# Patient Record
Sex: MALE | Race: WHITE | Employment: FULL TIME | URBAN - METROPOLITAN AREA
[De-identification: names, ages, dates, MRNs, and addresses within clinical notes are randomized per-mention and may not be internally consistent; named-entity substitution may affect disease eponyms.]

---

## 2015-08-05 LAB
ALBUMIN SERPL-MCNC: 4.7 G/DL
ALP BLD-CCNC: 72 U/L
ALT SERPL-CCNC: 28 U/L
ANION GAP SERPL CALCULATED.3IONS-SCNC: 2.2 MMOL/L
AST SERPL-CCNC: 16 U/L
BASOPHILS ABSOLUTE: NORMAL /ΜL
BASOPHILS RELATIVE PERCENT: NORMAL %
BILIRUB SERPL-MCNC: 0.7 MG/DL (ref 0.1–1.4)
BUN BLDV-MCNC: 11 MG/DL
CALCIUM SERPL-MCNC: 9.5 MG/DL
CHLORIDE BLD-SCNC: 100 MMOL/L
CHOLESTEROL, TOTAL: 203 MG/DL
CHOLESTEROL/HDL RATIO: 5.5
CO2: NORMAL MMOL/L
CREAT SERPL-MCNC: 0.88 MG/DL
EOSINOPHILS ABSOLUTE: NORMAL /ΜL
EOSINOPHILS RELATIVE PERCENT: NORMAL %
GFR CALCULATED: 118
GLUCOSE BLD-MCNC: 90 MG/DL
HCT VFR BLD CALC: 47.7 % (ref 41–53)
HDLC SERPL-MCNC: 37 MG/DL (ref 35–70)
HEMOGLOBIN: 16.1 G/DL (ref 13.5–17.5)
IRON: 97
LDL CHOLESTEROL CALCULATED: 125 MG/DL (ref 0–160)
LYMPHOCYTES ABSOLUTE: NORMAL /ΜL
LYMPHOCYTES RELATIVE PERCENT: NORMAL %
MCH RBC QN AUTO: 31 PG
MCHC RBC AUTO-ENTMCNC: 33.8 G/DL
MCV RBC AUTO: 92 FL
MONOCYTES ABSOLUTE: NORMAL /ΜL
MONOCYTES RELATIVE PERCENT: NORMAL %
NEUTROPHILS ABSOLUTE: NORMAL /ΜL
NEUTROPHILS RELATIVE PERCENT: NORMAL %
PLATELET # BLD: 239 K/ΜL
PMV BLD AUTO: NORMAL FL
POTASSIUM SERPL-SCNC: 4 MMOL/L
RBC # BLD: 5.2 10^6/ΜL
SODIUM BLD-SCNC: 142 MMOL/L
TOTAL PROTEIN: 6.8
TRIGL SERPL-MCNC: 204 MG/DL
URIC ACID: 7
VLDLC SERPL CALC-MCNC: 41 MG/DL
WBC # BLD: 5.6 10^3/ML

## 2016-06-24 LAB
ALBUMIN SERPL-MCNC: 4.7 G/DL
ALP BLD-CCNC: 72 U/L
ALT SERPL-CCNC: 32 U/L
ANION GAP SERPL CALCULATED.3IONS-SCNC: 2.2 MMOL/L
AST SERPL-CCNC: 18 U/L
AVERAGE GLUCOSE: NORMAL
BASOPHILS ABSOLUTE: NORMAL /ΜL
BASOPHILS RELATIVE PERCENT: NORMAL %
BILIRUB SERPL-MCNC: 0.7 MG/DL (ref 0.1–1.4)
BUN BLDV-MCNC: 10 MG/DL
CALCIUM SERPL-MCNC: 9.5 MG/DL
CHLORIDE BLD-SCNC: 101 MMOL/L
CHOLESTEROL, TOTAL: 206 MG/DL
CHOLESTEROL/HDL RATIO: 4.8
CO2: NORMAL MMOL/L
CREAT SERPL-MCNC: 0.87 MG/DL
EOSINOPHILS ABSOLUTE: NORMAL /ΜL
EOSINOPHILS RELATIVE PERCENT: NORMAL %
GFR CALCULATED: 118
GLUCOSE BLD-MCNC: 88 MG/DL
HBA1C MFR BLD: 5.5 %
HCT VFR BLD CALC: 42.9 % (ref 41–53)
HDLC SERPL-MCNC: 43 MG/DL (ref 35–70)
HEMOGLOBIN: 14.6 G/DL (ref 13.5–17.5)
IRON: 120
LDL CHOLESTEROL CALCULATED: 134 MG/DL (ref 0–160)
LYMPHOCYTES ABSOLUTE: NORMAL /ΜL
LYMPHOCYTES RELATIVE PERCENT: NORMAL %
MCH RBC QN AUTO: 30.7 PG
MCHC RBC AUTO-ENTMCNC: 34 G/DL
MCV RBC AUTO: 90 FL
MONOCYTES ABSOLUTE: NORMAL /ΜL
MONOCYTES RELATIVE PERCENT: NORMAL %
NEUTROPHILS ABSOLUTE: NORMAL /ΜL
NEUTROPHILS RELATIVE PERCENT: NORMAL %
PLATELET # BLD: 242 K/ΜL
PMV BLD AUTO: NORMAL FL
POTASSIUM SERPL-SCNC: 4.3 MMOL/L
RBC # BLD: 4.76 10^6/ΜL
SODIUM BLD-SCNC: 140 MMOL/L
TOTAL PROTEIN: 6.8
TRIGL SERPL-MCNC: 147 MG/DL
URIC ACID: 7.5
VLDLC SERPL CALC-MCNC: 29 MG/DL
WBC # BLD: 4.9 10^3/ML

## 2017-06-22 LAB
ALBUMIN SERPL-MCNC: 4.5 G/DL
ALP BLD-CCNC: 74 U/L
ALT SERPL-CCNC: 39 U/L
ANION GAP SERPL CALCULATED.3IONS-SCNC: 2 MMOL/L
AST SERPL-CCNC: 27 U/L
BASOPHILS ABSOLUTE: NORMAL /ΜL
BASOPHILS RELATIVE PERCENT: NORMAL %
BILIRUB SERPL-MCNC: 0.7 MG/DL (ref 0.1–1.4)
BUN BLDV-MCNC: 12 MG/DL
CALCIUM SERPL-MCNC: 9.6 MG/DL
CHLORIDE BLD-SCNC: 102 MMOL/L
CHOLESTEROL, TOTAL: 198 MG/DL
CHOLESTEROL/HDL RATIO: 5
CO2: NORMAL MMOL/L
CREAT SERPL-MCNC: 0.87 MG/DL
EOSINOPHILS ABSOLUTE: NORMAL /ΜL
EOSINOPHILS RELATIVE PERCENT: NORMAL %
GFR CALCULATED: 117
GLUCOSE BLD-MCNC: 88 MG/DL
HCT VFR BLD CALC: 41.8 % (ref 41–53)
HDLC SERPL-MCNC: 40 MG/DL (ref 35–70)
HEMOGLOBIN: 14.4 G/DL (ref 13.5–17.5)
IRON: 163
LDL CHOLESTEROL CALCULATED: 128 MG/DL (ref 0–160)
LYMPHOCYTES ABSOLUTE: NORMAL /ΜL
LYMPHOCYTES RELATIVE PERCENT: NORMAL %
MCH RBC QN AUTO: 30.8 PG
MCHC RBC AUTO-ENTMCNC: 34.4 G/DL
MCV RBC AUTO: 90 FL
MONOCYTES ABSOLUTE: NORMAL /ΜL
MONOCYTES RELATIVE PERCENT: NORMAL %
NEUTROPHILS ABSOLUTE: NORMAL /ΜL
NEUTROPHILS RELATIVE PERCENT: NORMAL %
PLATELET # BLD: 257 K/ΜL
PMV BLD AUTO: NORMAL FL
POTASSIUM SERPL-SCNC: 4.4 MMOL/L
RBC # BLD: 4.67 10^6/ΜL
SODIUM BLD-SCNC: 143 MMOL/L
TOTAL PROTEIN: 6.7
TRIGL SERPL-MCNC: 148 MG/DL
URIC ACID: 7.6
VLDLC SERPL CALC-MCNC: 128 MG/DL
WBC # BLD: 6 10^3/ML

## 2017-12-06 ENCOUNTER — OFFICE VISIT (OUTPATIENT)
Dept: FAMILY MEDICINE CLINIC | Age: 27
End: 2017-12-06

## 2017-12-06 VITALS
SYSTOLIC BLOOD PRESSURE: 132 MMHG | DIASTOLIC BLOOD PRESSURE: 94 MMHG | HEART RATE: 79 BPM | WEIGHT: 276 LBS | BODY MASS INDEX: 37.38 KG/M2 | HEIGHT: 72 IN

## 2017-12-06 DIAGNOSIS — I10 ESSENTIAL HYPERTENSION: ICD-10-CM

## 2017-12-06 DIAGNOSIS — K21.00 GASTROESOPHAGEAL REFLUX DISEASE WITH ESOPHAGITIS: ICD-10-CM

## 2017-12-06 DIAGNOSIS — J30.2 CHRONIC SEASONAL ALLERGIC RHINITIS, UNSPECIFIED TRIGGER: Primary | ICD-10-CM

## 2017-12-06 DIAGNOSIS — E88.81 METABOLIC SYNDROME: ICD-10-CM

## 2017-12-06 PROCEDURE — 99214 OFFICE O/P EST MOD 30 MIN: CPT | Performed by: FAMILY MEDICINE

## 2017-12-06 RX ORDER — MONTELUKAST SODIUM 10 MG/1
10 TABLET ORAL NIGHTLY
Qty: 30 TABLET | Refills: 3 | Status: SHIPPED | OUTPATIENT
Start: 2017-12-06 | End: 2018-04-16 | Stop reason: ALTCHOICE

## 2017-12-06 NOTE — PROGRESS NOTES
Chief Complaint   Patient presents with   Justino Almazan Doctor       HPI / ROS: Lacy Pacheco presents for evaluation and management of :    # new patient to be established  # GERD OK omeprazole per pt. He had EGD 6 mos ago in [facility]. He feels like there is a \"clicking\" in his throat and sore throats that last weeks. AM sore throat. # HTN recheck twice no CP/SOB. # chronic PND and alergies    Patient's allergies, past family, medical, surgical, and social history, Rx and OTC meds are reviewed as part of today's visit and Marked as Reviewed. Objective   Wt Readings from Last 3 Encounters:   12/06/17 276 lb (125.2 kg)   10/30/16 260 lb (117.9 kg)   01/02/15 270 lb (122.5 kg)       A&O  BP (!) 132/94   Pulse 79   Ht 6' (1.829 m)   Wt 276 lb (125.2 kg)   BMI 37.43 kg/m²    Ears alvarez fluid  Nose pale wet  thr poor airway  Eyes no scleral icterus  Skin no rash no jaundice  Neck no TMG no LAD  Car reg no MGR  Lungs CTA  abd benign soft obese  Ext no pitting edema  Psych: Judgement and insight are intact, no pressured speech; no psychomotor retardation or agitation; affect and mood congruent        1. Chronic seasonal allergic rhinitis, unspecified trigger      trial singulair     2. Gastroesophageal reflux disease with esophagitis      cont omeprazole 20 Mg OTC   3. Essential hypertension      we aree to recheck few weeks   4. Metabolic syndrome      we discus low grain low carg diet and revisit one month     No orders of the defined types were placed in this encounter.

## 2018-04-16 ENCOUNTER — OFFICE VISIT (OUTPATIENT)
Dept: FAMILY MEDICINE CLINIC | Age: 28
End: 2018-04-16

## 2018-04-16 VITALS
DIASTOLIC BLOOD PRESSURE: 88 MMHG | HEART RATE: 88 BPM | OXYGEN SATURATION: 98 % | BODY MASS INDEX: 38.19 KG/M2 | SYSTOLIC BLOOD PRESSURE: 124 MMHG | WEIGHT: 282 LBS | HEIGHT: 72 IN

## 2018-04-16 DIAGNOSIS — J32.9 CHRONIC SINUSITIS, UNSPECIFIED LOCATION: Primary | ICD-10-CM

## 2018-04-16 PROCEDURE — 99213 OFFICE O/P EST LOW 20 MIN: CPT | Performed by: FAMILY MEDICINE

## 2018-04-16 RX ORDER — AMOXICILLIN AND CLAVULANATE POTASSIUM 875; 125 MG/1; MG/1
1 TABLET, FILM COATED ORAL 2 TIMES DAILY
Qty: 14 TABLET | Refills: 0 | Status: SHIPPED | OUTPATIENT
Start: 2018-04-16 | End: 2018-04-23

## 2018-04-16 ASSESSMENT — PATIENT HEALTH QUESTIONNAIRE - PHQ9
SUM OF ALL RESPONSES TO PHQ QUESTIONS 1-9: 0
SUM OF ALL RESPONSES TO PHQ9 QUESTIONS 1 & 2: 0
1. LITTLE INTEREST OR PLEASURE IN DOING THINGS: 0
2. FEELING DOWN, DEPRESSED OR HOPELESS: 0

## 2019-02-22 ENCOUNTER — OFFICE VISIT (OUTPATIENT)
Dept: FAMILY MEDICINE CLINIC | Age: 29
End: 2019-02-22
Payer: COMMERCIAL

## 2019-02-22 VITALS
OXYGEN SATURATION: 97 % | BODY MASS INDEX: 38.22 KG/M2 | TEMPERATURE: 97 F | RESPIRATION RATE: 16 BRPM | WEIGHT: 282.2 LBS | HEIGHT: 72 IN | DIASTOLIC BLOOD PRESSURE: 85 MMHG | SYSTOLIC BLOOD PRESSURE: 139 MMHG | HEART RATE: 90 BPM

## 2019-02-22 DIAGNOSIS — H66.001 ACUTE SUPPURATIVE OTITIS MEDIA OF RIGHT EAR WITHOUT SPONTANEOUS RUPTURE OF TYMPANIC MEMBRANE, RECURRENCE NOT SPECIFIED: Primary | ICD-10-CM

## 2019-02-22 LAB
INFLUENZA A ANTIBODY: NORMAL
INFLUENZA B ANTIBODY: NORMAL

## 2019-02-22 PROCEDURE — 99213 OFFICE O/P EST LOW 20 MIN: CPT | Performed by: FAMILY MEDICINE

## 2019-02-22 PROCEDURE — 87804 INFLUENZA ASSAY W/OPTIC: CPT | Performed by: FAMILY MEDICINE

## 2019-02-22 RX ORDER — PREDNISONE 10 MG/1
10 TABLET ORAL 2 TIMES DAILY
Qty: 10 TABLET | Refills: 0 | Status: SHIPPED | OUTPATIENT
Start: 2019-02-22 | End: 2019-02-27

## 2019-02-22 RX ORDER — AZITHROMYCIN 250 MG/1
TABLET, FILM COATED ORAL
Qty: 1 PACKET | Refills: 0 | Status: SHIPPED | OUTPATIENT
Start: 2019-02-22 | End: 2019-03-04

## 2019-03-04 ENCOUNTER — OFFICE VISIT (OUTPATIENT)
Dept: FAMILY MEDICINE CLINIC | Age: 29
End: 2019-03-04
Payer: COMMERCIAL

## 2019-03-04 VITALS
SYSTOLIC BLOOD PRESSURE: 136 MMHG | DIASTOLIC BLOOD PRESSURE: 90 MMHG | OXYGEN SATURATION: 98 % | HEIGHT: 72 IN | HEART RATE: 100 BPM | BODY MASS INDEX: 37.94 KG/M2 | WEIGHT: 280.13 LBS

## 2019-03-04 DIAGNOSIS — Z13.220 SCREENING FOR LIPID DISORDERS: ICD-10-CM

## 2019-03-04 DIAGNOSIS — Z00.00 ROUTINE GENERAL MEDICAL EXAMINATION AT A HEALTH CARE FACILITY: Primary | ICD-10-CM

## 2019-03-04 DIAGNOSIS — I10 ESSENTIAL HYPERTENSION: ICD-10-CM

## 2019-03-04 PROCEDURE — 99395 PREV VISIT EST AGE 18-39: CPT | Performed by: FAMILY MEDICINE

## 2019-03-04 ASSESSMENT — PATIENT HEALTH QUESTIONNAIRE - PHQ9
2. FEELING DOWN, DEPRESSED OR HOPELESS: 0
SUM OF ALL RESPONSES TO PHQ QUESTIONS 1-9: 0
1. LITTLE INTEREST OR PLEASURE IN DOING THINGS: 0
SUM OF ALL RESPONSES TO PHQ QUESTIONS 1-9: 0
SUM OF ALL RESPONSES TO PHQ9 QUESTIONS 1 & 2: 0

## 2019-04-21 ENCOUNTER — HOSPITAL ENCOUNTER (EMERGENCY)
Age: 29
Discharge: HOME | End: 2019-04-21
Attending: EMERGENCY MEDICINE
Payer: COMMERCIAL

## 2019-04-21 VITALS
TEMPERATURE: 98.7 F | OXYGEN SATURATION: 94 % | HEART RATE: 89 BPM | DIASTOLIC BLOOD PRESSURE: 85 MMHG | BODY MASS INDEX: 38.27 KG/M2 | SYSTOLIC BLOOD PRESSURE: 121 MMHG | RESPIRATION RATE: 14 BRPM | HEIGHT: 71 IN | WEIGHT: 273.37 LBS

## 2019-04-21 DIAGNOSIS — K52.9 GASTROENTERITIS: Primary | ICD-10-CM

## 2019-04-21 PROCEDURE — 99283 EMERGENCY DEPT VISIT LOW MDM: CPT

## 2019-04-21 PROCEDURE — 96374 THER/PROPH/DIAG INJ IV PUSH: CPT

## 2019-04-21 PROCEDURE — 6360000002 HC RX W HCPCS: Performed by: EMERGENCY MEDICINE

## 2019-04-21 PROCEDURE — 96361 HYDRATE IV INFUSION ADD-ON: CPT

## 2019-04-21 PROCEDURE — 2580000003 HC RX 258: Performed by: EMERGENCY MEDICINE

## 2019-04-21 RX ORDER — 0.9 % SODIUM CHLORIDE 0.9 %
1000 INTRAVENOUS SOLUTION INTRAVENOUS ONCE
Status: COMPLETED | OUTPATIENT
Start: 2019-04-21 | End: 2019-04-21

## 2019-04-21 RX ORDER — ONDANSETRON 4 MG/1
4 TABLET, FILM COATED ORAL EVERY 8 HOURS PRN
Qty: 20 TABLET | Refills: 0 | Status: SHIPPED | OUTPATIENT
Start: 2019-04-21 | End: 2019-09-15

## 2019-04-21 RX ORDER — ONDANSETRON 2 MG/ML
4 INJECTION INTRAMUSCULAR; INTRAVENOUS ONCE
Status: COMPLETED | OUTPATIENT
Start: 2019-04-21 | End: 2019-04-21

## 2019-04-21 RX ORDER — PROMETHAZINE HYDROCHLORIDE 25 MG/ML
25 INJECTION, SOLUTION INTRAMUSCULAR; INTRAVENOUS ONCE
Status: DISCONTINUED | OUTPATIENT
Start: 2019-04-21 | End: 2019-04-21 | Stop reason: HOSPADM

## 2019-04-21 RX ADMIN — SODIUM CHLORIDE 1000 ML: 9 INJECTION, SOLUTION INTRAVENOUS at 02:56

## 2019-04-21 RX ADMIN — ONDANSETRON 4 MG: 2 INJECTION INTRAMUSCULAR; INTRAVENOUS at 02:56

## 2019-04-21 ASSESSMENT — PAIN SCALES - GENERAL
PAINLEVEL_OUTOF10: 2
PAINLEVEL_OUTOF10: 6
PAINLEVEL_OUTOF10: 6

## 2019-04-21 ASSESSMENT — PAIN DESCRIPTION - LOCATION: LOCATION: ABDOMEN;GENERALIZED

## 2019-04-21 ASSESSMENT — PAIN DESCRIPTION - PAIN TYPE: TYPE: ACUTE PAIN

## 2019-04-21 ASSESSMENT — PAIN DESCRIPTION - DESCRIPTORS: DESCRIPTORS: SORE

## 2019-04-21 ASSESSMENT — PAIN - FUNCTIONAL ASSESSMENT: PAIN_FUNCTIONAL_ASSESSMENT: 0-10

## 2019-04-21 NOTE — ED PROVIDER NOTES
CHIEF COMPLAINT  Chief Complaint   Patient presents with    Emesis     since friday night  . had stopped  and returned at [address] [facility],        [facility]  Aditi Westfall is a 25 y.o. male who presents to the ED complaining of a one-day history of nausea, vomiting, watery diarrhea with epigastric abdominal cramping. Patient denies fevers or chills. No chest pain or shortness of breath. No rash. No dysuria or hematuria. No back pain. Patient reports lightheadedness and fatigue. No cough. No headache. Patient denies hematemesis, bilious emesis, melena or bright red blood per rectum. No mucoid stools. No recent foreign travel. No other complaints, modifying factors or associated symptoms. Nursing notes reviewed. Past Medical History:   Diagnosis Date    GERD (gastroesophageal reflux disease)      No past surgical history on file.   Family History   Problem Relation Age of Onset    Cancer Maternal Grandfather     Diabetes Paternal Grandfather     Cancer Paternal Grandfather         pancreas     Social History     Socioeconomic History    Marital status:      Spouse name: Not on file    Number of children: Not on file    Years of education: Not on file    Highest education level: Not on file   Occupational History    Not on file   Social Needs    Financial resource strain: Not on file    Food insecurity:     Worry: Not on file     Inability: Not on file    Transportation needs:     Medical: Not on file     Non-medical: Not on file   Tobacco Use    Smoking status: Former Smoker    Smokeless tobacco: Never Used   Substance and Sexual Activity    Alcohol use: No    Drug use: No    Sexual activity: Yes     Partners: Female   Lifestyle    Physical activity:     Days per week: Not on file     Minutes per session: Not on file    Stress: Not on file   Relationships    Social connections:     Talks on phone: Not on file     Gets together: Not on file     Attends [employer] service: Not on file     Active member of club or organization: Not on file     Attends meetings of clubs or organizations: Not on file     Relationship status: Not on file    Intimate partner violence:     Fear of current or ex partner: Not on file     Emotionally abused: Not on file     Physically abused: Not on file     Forced sexual activity: Not on file   Other Topics Concern    Not on file   Social History Narrative    Not on file     Current Facility-Administered Medications   Medication Dose Route Frequency Provider Last Rate Last Dose    0.9 % sodium chloride bolus  1,000 mL Intravenous Once Watson Snellen, MD        promethazine (PHENERGAN) injection 25 mg  25 mg Intravenous Once Watson Snellen, MD        [facility]etron [facility]) injection 4 mg  4 mg Intravenous Once Watson Snellen, MD         Current Outpatient Medications   Medication Sig Dispense Refill    ondansetron (ZOFRAN) 4 MG tablet Take 1 tablet by mouth every 8 hours as needed for Nausea 20 tablet 0    omeprazole (PRILOSEC OTC) 20 MG tablet Take 20 mg by mouth daily.  metoprolol tartrate (LOPRESSOR) 25 MG tablet Take 1 tablet by mouth 2 times daily 60 tablet 5     No Known Allergies    REVIEW OF SYSTEMS  Positives and pertinent negatives as per HPI. Six others systems were reviewed and are negative. Nursing notes pertaining to ROS were reviewed. PHYSICAL EXAM   /88   Pulse 102   Temp 98.7 °F (37.1 °C) (Oral)   Resp 14   Ht 5' 11\" (1.803 m)   Wt 273 lb 5.9 oz (124 kg)   SpO2 93%   BMI 38.13 kg/m²   General: Alert and oriented x 3, NAD. No increased work of breathing or accessory muscle use. Non-ill appearing. Appropriate and interactive  Eyes: PERRL, no scleral icterus, injection or exudate. EOMI. HENT: Atraumatic. Oral pharynx is clear, moist, no enanthem. No tonsillar hypertropy or exudate. Nasal mucous membranes are clear. TM's are clear without evidence of otitis media. Neck:  No Lymphadenopathy. Non-tender to palpation. Normal ROM. No JVD. No thyromegaly. No Mass. PULMONARY: Lungs clear bilaterally without wheezes, rales or rhonchi. Good air movement bilaterally. CV: Regular rate and rhythm without murmurs, rubs or gallops. ABD: Soft, mildly tender in the periumbilical area, non-distended, normal bowel sounds, no hepatosplenomegaly, no masses. No peritoneal signs, rebound or guarding. Back:  No CVAT, no rash. EXT: No cyanosis or clubbing. No rash. CR < 2 seconds. No tenderness to palpation. No lower extremity edema. +2 pulses in upper/lower extremities bilaterally. Skin is warm and dry. PSYCH: normal affect      ED COURSE/MDM  Acute gastroenteritis:  Patient was given IV fluids and antiemetics in the emergency room with moderate relief of symptoms. Patient will be sent home with a prescription for Zofran. Push fluids. I estimate there is LOW risk for ACUTE APPENDICITIS, BOWEL OBSTRUCTION, CHOLECYSTITIS, DIVERTICULITIS, STRANGULATED HERNIA, PANCREATITIS, AAA,  TESTICULAR TORSION, PERFORATED BOWEL, PYELONEPHRITIS,  BOWEL ISCHEMIA, CARDIAC ISCHEMIA, INTRA-ABDOMINAL ABSCESS, thus I consider the discharge disposition reasonable. Also, there is no evidence or peritonitis, sepsis, or toxicity. We also discussed returning to the Emergency Department immediately if new or worsening symptoms occur. The patient's condition in the ED was good, the patient was afebrile and nontoxic in appearance, and the patient's physical exam was unremarkable. Vital signs are normal, and the patient did not appear to be in pain. There was no indication for hospitalization or further workup. Patient will be discharged and was advised to follow-up with family doctor in about 3 days if no improvement is seen by that time. The patient verbalized understanding and agreement with this plan of care.  Pertinent positive laboratory findings were discussed with the patient and patient was advised to

## 2019-04-21 NOTE — ED NOTES
Condition pain unchanged from arrival discharge instructions given with prescription x 172 Scout Pat RN  04/21/19 8178

## 2019-07-15 LAB
ALBUMIN SERPL-MCNC: 4.7 G/DL
ALP BLD-CCNC: 77 U/L
ALT SERPL-CCNC: 38 U/L
ANION GAP SERPL CALCULATED.3IONS-SCNC: 2 MMOL/L
AST SERPL-CCNC: 17 U/L
AVERAGE GLUCOSE: NORMAL
BASOPHILS ABSOLUTE: NORMAL /ΜL
BASOPHILS RELATIVE PERCENT: NORMAL %
BILIRUB SERPL-MCNC: 0.5 MG/DL (ref 0.1–1.4)
BUN BLDV-MCNC: 7 MG/DL
CALCIUM SERPL-MCNC: 9.7 MG/DL
CHLORIDE BLD-SCNC: 102 MMOL/L
CHOLESTEROL, TOTAL: 209 MG/DL
CHOLESTEROL/HDL RATIO: 5
CO2: NORMAL MMOL/L
CREAT SERPL-MCNC: 0.87 MG/DL
EOSINOPHILS ABSOLUTE: NORMAL /ΜL
EOSINOPHILS RELATIVE PERCENT: NORMAL %
GFR CALCULATED: 116
GLUCOSE BLD-MCNC: 88 MG/DL
HBA1C MFR BLD: 5.3 %
HCT VFR BLD CALC: 44.9 % (ref 41–53)
HDLC SERPL-MCNC: 42 MG/DL (ref 35–70)
HEMOGLOBIN: 15.8 G/DL (ref 13.5–17.5)
IRON: 82
LDL CHOLESTEROL CALCULATED: 140 MG/DL (ref 0–160)
LYMPHOCYTES ABSOLUTE: NORMAL /ΜL
LYMPHOCYTES RELATIVE PERCENT: NORMAL %
MCH RBC QN AUTO: 30.8 PG
MCHC RBC AUTO-ENTMCNC: 35.2 G/DL
MCV RBC AUTO: 88 FL
MONOCYTES ABSOLUTE: NORMAL /ΜL
MONOCYTES RELATIVE PERCENT: NORMAL %
NEUTROPHILS ABSOLUTE: NORMAL /ΜL
NEUTROPHILS RELATIVE PERCENT: NORMAL %
PLATELET # BLD: 268 K/ΜL
PMV BLD AUTO: NORMAL FL
POTASSIUM SERPL-SCNC: 4.1 MMOL/L
RBC # BLD: 5.13 10^6/ΜL
SODIUM BLD-SCNC: 141 MMOL/L
TOTAL PROTEIN: 7.1
TRIGL SERPL-MCNC: 136 MG/DL
URIC ACID: 7.2
VLDLC SERPL CALC-MCNC: 27 MG/DL
WBC # BLD: 5.6 10^3/ML

## 2019-07-19 ENCOUNTER — OFFICE VISIT (OUTPATIENT)
Dept: FAMILY MEDICINE CLINIC | Age: 29
End: 2019-07-19
Payer: COMMERCIAL

## 2019-07-19 VITALS
BODY MASS INDEX: 38.38 KG/M2 | WEIGHT: 275.2 LBS | OXYGEN SATURATION: 98 % | HEART RATE: 83 BPM | SYSTOLIC BLOOD PRESSURE: 136 MMHG | DIASTOLIC BLOOD PRESSURE: 88 MMHG | RESPIRATION RATE: 15 BRPM

## 2019-07-19 DIAGNOSIS — I80.01: Primary | ICD-10-CM

## 2019-07-19 PROCEDURE — 99213 OFFICE O/P EST LOW 20 MIN: CPT | Performed by: FAMILY MEDICINE

## 2019-07-19 NOTE — PROGRESS NOTES
(Patient not taking: Reported on 7/19/2019), Disp: 60 tablet, Rfl: 5  No Known Allergies    Patient Active Problem List   Diagnosis    Muscle spasm    Plantar fasciitis    Essential hypertension       HPI / ROS: Toyin Jacinto presents for evaluation and management of :    # acute for R leg pain duration 2 years. No CP/SOB. Prominent vein for 2 years sometimes warm to touch in medial thigh. Objective   Wt Readings from Last 3 Encounters:   07/19/19 275 lb 3.2 oz (124.8 kg)   04/21/19 273 lb 5.9 oz (124 kg)   03/04/19 280 lb 2 oz (127.1 kg)       A&O  /88   Pulse 83   Resp 15   Wt 275 lb 3.2 oz (124.8 kg)   SpO2 98%   BMI 38.38 kg/m²   Car reg  Ext R medial thigh has bluish warm varicosities chronic of saphenous system  Psych: Judgement and insight are intact, no pressured speech; no psychomotor retardation or agitation; affect and mood congruent         Diagnosis Orders   1.  Chronic phlebitis of superficial vein of right lower extremity  US DOPPLER VENOUS LEGS B/L    asa 325 BID x 5 days and check LE dopplers     Orders Placed This Encounter   Procedures    US DOPPLER VENOUS LEGS B/L     Standing Status:   Future     Standing Expiration Date:   9/17/2019

## 2019-07-29 ENCOUNTER — HOSPITAL ENCOUNTER (OUTPATIENT)
Dept: VASCULAR LAB | Age: 29
Discharge: HOME | End: 2019-07-29
Payer: COMMERCIAL

## 2019-07-29 DIAGNOSIS — I80.01: ICD-10-CM

## 2019-07-29 PROCEDURE — 93970 EXTREMITY STUDY: CPT

## 2019-09-15 ENCOUNTER — HOSPITAL ENCOUNTER (EMERGENCY)
Age: 29
Discharge: HOME | End: 2019-09-15
Attending: EMERGENCY MEDICINE
Payer: COMMERCIAL

## 2019-09-15 VITALS
DIASTOLIC BLOOD PRESSURE: 92 MMHG | BODY MASS INDEX: 37.65 KG/M2 | RESPIRATION RATE: 16 BRPM | SYSTOLIC BLOOD PRESSURE: 131 MMHG | HEART RATE: 104 BPM | HEIGHT: 72 IN | OXYGEN SATURATION: 96 % | TEMPERATURE: 98 F | WEIGHT: 278 LBS

## 2019-09-15 DIAGNOSIS — L03.116 CELLULITIS OF LEFT LOWER LEG: ICD-10-CM

## 2019-09-15 DIAGNOSIS — T63.481A INSECT STINGS, ACCIDENTAL OR UNINTENTIONAL, INITIAL ENCOUNTER: Primary | ICD-10-CM

## 2019-09-15 PROCEDURE — 99282 EMERGENCY DEPT VISIT SF MDM: CPT

## 2019-09-15 PROCEDURE — 6370000000 HC RX 637 (ALT 250 FOR IP): Performed by: EMERGENCY MEDICINE

## 2019-09-15 RX ORDER — CEPHALEXIN 500 MG/1
500 CAPSULE ORAL 3 TIMES DAILY
Qty: 15 CAPSULE | Refills: 0 | Status: SHIPPED | OUTPATIENT
Start: 2019-09-15 | End: 2019-09-20

## 2019-09-15 RX ORDER — IBUPROFEN 600 MG/1
600 TABLET ORAL EVERY 6 HOURS PRN
Qty: 20 TABLET | Refills: 0 | Status: SHIPPED | OUTPATIENT
Start: 2019-09-15 | End: 2021-07-26

## 2019-09-15 RX ORDER — IBUPROFEN 600 MG/1
600 TABLET ORAL ONCE
Status: COMPLETED | OUTPATIENT
Start: 2019-09-15 | End: 2019-09-15

## 2019-09-15 RX ORDER — CEPHALEXIN 500 MG/1
500 CAPSULE ORAL ONCE
Status: COMPLETED | OUTPATIENT
Start: 2019-09-15 | End: 2019-09-15

## 2019-09-15 RX ADMIN — CEPHALEXIN 500 MG: 500 CAPSULE ORAL at 14:53

## 2019-09-15 RX ADMIN — IBUPROFEN 600 MG: 600 TABLET, FILM COATED ORAL at 14:53

## 2019-09-15 ASSESSMENT — PAIN SCALES - GENERAL
PAINLEVEL_OUTOF10: 3

## 2019-09-15 ASSESSMENT — PAIN DESCRIPTION - ORIENTATION: ORIENTATION: LOWER

## 2019-09-15 ASSESSMENT — PAIN DESCRIPTION - DESCRIPTORS: DESCRIPTORS: ACHING

## 2019-09-15 ASSESSMENT — PAIN DESCRIPTION - LOCATION: LOCATION: LEG

## 2019-09-15 ASSESSMENT — PAIN DESCRIPTION - PAIN TYPE: TYPE: ACUTE PAIN

## 2019-09-15 NOTE — ED PROVIDER NOTES
aspect. He has a not of dense swelling about 2 cm in size, with a bite wound in the middle of it. He has no foreign material still present in it. He has surrounding cellulitis that extends more distally, although some extends proximal to the bite site. He has some lymphangitis up the posterior medial aspect of the left leg. I don't feel any lymphadenopathy. He has no other bite wounds or open areas. The area is warm to touch and feels like cellulitis. NEUROLOGICAL: Alert and oriented. Strength is 5/5 in all extremities and sensation is intact. LABS  No results found for this visit on 09/15/19. RADIOLOGY  No orders to display         ED COURSE/MDM  Patient seen and evaluated. Patient was evaluated and found to have a sting wound with cellulitis. Vomited treat him with pain medicine and antibiotics. He has no signs of a systemic reaction nor any airway involvement. I considered the potential for anaphylaxis in the future, and I don't believe its present based on this local reaction. Patient was given:   Medications   cephALEXin (KEFLEX) capsule 500 mg (500 mg Oral Given 9/15/19 1453)   ibuprofen (ADVIL;MOTRIN) tablet 600 mg (600 mg Oral Given 9/15/19 1453)       Pt is to follow up in 2-3 days for a recheck. Patient was given scripts for the following medications. I counseled patient how to take these medications. Discharge Medication List as of 9/15/2019  2:54 PM      START taking these medications    Details   cephALEXin (KEFLEX) 500 MG capsule Take 1 capsule by mouth 3 times daily for 5 days, Disp-15 capsule, R-0Print      ibuprofen (ADVIL;MOTRIN) 600 MG tablet Take 1 tablet by mouth every 6 hours as needed for Pain, Disp-20 tablet, R-0Print                 CLINICAL IMPRESSION  1. Insect stings, accidental or unintentional, initial encounter    2. Cellulitis of left lower leg        Blood pressure (!) 131/92, pulse 104, temperature 98 °F (36.7 °C), temperature source Oral, resp.  rate 16, height 6' (1.829 m), weight 278 lb (126.1 kg), SpO2 96 %. DISPOSITION Decision To Discharge 09/15/2019 02:52:05 PM      Comment: Please note this report has been produced using speech recognition software and may contain errors related to that system including errors in grammar, punctuation, and spelling, as well as words and phrases that may be inappropriate. If there are any questions or concerns please feel free to contact the dictating provider for clarification.         Tamara Lea MD  09/15/19 4250

## 2020-11-24 ENCOUNTER — TELEPHONE (OUTPATIENT)
Dept: FAMILY MEDICINE CLINIC | Age: 30
End: 2020-11-24

## 2020-11-24 NOTE — TELEPHONE ENCOUNTER
----- Message from Junnie Hodgkin sent at 11/24/2020 10:48 AM EST -----  Subject: Message to Provider    QUESTIONS  Information for Provider? Sending pt to the [facility] test scheduling line. pt   wants to know after getting tested if  will send letter to his employer   by fax or email of that status. His wife tested positive   he doesn't have Sx. He says he thinks he already had it before pandemic.   ---------------------------------------------------------------------------  --------------  CALL BACK INFO  What is the best way for the office to contact you? OK to leave message on   voicemail  Preferred Call Back Phone Number? [phone]  ---------------------------------------------------------------------------  --------------  SCRIPT ANSWERS  Relationship to Patient?  Self

## 2020-11-25 NOTE — TELEPHONE ENCOUNTER
[facility]    ([address] [place] [address], [facility] Phone: [phone]   Monday - Friday 8:00 am - 1:00 pm    Give him number to schedule. Let him know that testing during the time that the patient does not have symptoms, can lead to a false negative result. Testing negative does not rule out Covid in this instance.

## 2020-11-27 ENCOUNTER — OFFICE VISIT (OUTPATIENT)
Dept: PRIMARY CARE CLINIC | Age: 30
End: 2020-11-27
Payer: COMMERCIAL

## 2020-11-27 PROCEDURE — 99211 OFF/OP EST MAY X REQ PHY/QHP: CPT | Performed by: NURSE PRACTITIONER

## 2020-11-27 NOTE — PROGRESS NOTES
Alonso Lozano received a viral test for COVID-19. They were educated on isolation and quarantine as appropriate. For any symptoms, they were directed to seek care from their PCP, given contact information to establish with a doctor, directed to an urgent care or the emergency room.

## 2020-11-28 LAB — SARS-COV-2, NAA: NOT DETECTED

## 2020-11-30 ENCOUNTER — TELEPHONE (OUTPATIENT)
Dept: FAMILY MEDICINE CLINIC | Age: 30
End: 2020-11-30

## 2020-11-30 NOTE — TELEPHONE ENCOUNTER
Onset of sx started 11/21/20    Pt's wife still has loss of smell and cough. Denies any other sx.      Please advise

## 2020-11-30 NOTE — TELEPHONE ENCOUNTER
Please send copy of covid testing. When was onset of symptoms? How is she now? CDC guidelines for isolation in symptomatic patient with assumed COVID-19;        TO END ISOLATION: (Patient understands these guidelines are subject to change)  1. No fever for at least 72 hours without medications AND  2. Symptoms have resolved AND  3.  At least 7 days from initial symptom onset

## 2020-11-30 NOTE — TELEPHONE ENCOUNTER
Has he been symptsom free without medication x 3 days. If not needs to be symptoms free prior to return. Ok to draft letter and send with copy of covid test it above true. If he is still having cough, congestion or runny nose not ready to return    Pt was diagnoed with covid and symptoms sarted 11.21.2020.   He has been symptom free x 3 days and may return to work

## 2020-11-30 NOTE — TELEPHONE ENCOUNTER
PT called in to follow up with his earlier message. Pt says that a copy of his results would not suffice. PT's employer says that he needs a letter from our office stating that PT is okay to return to work. This can be emailed to PT's employer: [email or web address].      Please call PT back once this has been done: [phone]

## 2021-06-07 ENCOUNTER — TELEPHONE (OUTPATIENT)
Dept: FAMILY MEDICINE CLINIC | Age: 31
End: 2021-06-07

## 2021-06-07 DIAGNOSIS — Z12.83 SKIN CANCER SCREENING: Primary | ICD-10-CM

## 2021-06-07 NOTE — TELEPHONE ENCOUNTER
----- Message from [facility] sent at 6/7/2021 12:33 PM EDT -----  Subject: Referral Request    QUESTIONS   Reason for referral request? Patient is wanting to get some moles checked   out on his skin and was wanting a referral for a dermatologist.   Has the physician seen you for this condition before? No   Preferred Specialist (if applicable)? Do you already have an appointment scheduled? No  Additional Information for Provider? Call pt back with referral.   ---------------------------------------------------------------------------  --------------  [address]  What is the best way for the office to contact you? OK to leave message on   voicemail  Preferred Call Back Phone Number?  [phone]

## 2021-07-11 NOTE — TELEPHONE ENCOUNTER
Problem: Sleep Disturbance  Goal: Adequate Sleep/Rest  Outcome: No Change   Pt slept 6.25 hours, respirations easy,c/o h/a upon awakening,tylenol given with effective analgesia   ----- Message from Sina Arana sent at 11/30/2020  3:35 PM EST -----  Subject: Message to Provider    QUESTIONS  Information for Provider? Pt called and said he wants to know if he can   have his covid results email or faxed to his employer and also he needs a   note that states he can return to work. Fax ? [phone] Email?   [email or web address]  ---------------------------------------------------------------------------  --------------  CALL BACK INFO  What is the best way for the office to contact you? OK to leave message on   voicemail  Preferred Call Back Phone Number? [phone]  ---------------------------------------------------------------------------  --------------  SCRIPT ANSWERS  Relationship to Patient?  Self

## 2021-07-12 LAB
A/G RATIO: NORMAL
ALBUMIN SERPL-MCNC: 4.6 G/DL
ALBUMIN SERPL-MCNC: NORMAL G/DL
ALP BLD-CCNC: 69 U/L
ALT SERPL-CCNC: 29 U/L
AST SERPL-CCNC: 18 U/L
AVERAGE GLUCOSE: NORMAL
BASOPHILS ABSOLUTE: NORMAL
BASOPHILS RELATIVE PERCENT: NORMAL
BILIRUB SERPL-MCNC: NORMAL MG/DL
BILIRUBIN DIRECT: 0.1 MG/DL
BILIRUBIN, INDIRECT: NORMAL
BUN / CREAT RATIO: NORMAL
BUN BLDV-MCNC: 10 MG/DL
CALCIUM SERPL-MCNC: 9.5 MG/DL
CHLORIDE BLD-SCNC: 102 MMOL/L
CHOLESTEROL, TOTAL: 220 MG/DL
CHOLESTEROL/HDL RATIO: 5.1
CO2: NORMAL
CREAT SERPL-MCNC: 0.96 MG/DL
EOSINOPHILS ABSOLUTE: NORMAL
EOSINOPHILS RELATIVE PERCENT: NORMAL
GLOBULIN: 2.4
GLUCOSE BLD-MCNC: 82 MG/DL
GLUCOSE: NORMAL
HBA1C MFR BLD: 5.1 %
HCT VFR BLD CALC: 43.3 % (ref 41–53)
HDLC SERPL-MCNC: 43 MG/DL (ref 35–70)
HEMOGLOBIN: 15.4 G/DL (ref 13.5–17.5)
IRON: 127
LDL CHOLESTEROL CALCULATED: 149 MG/DL (ref 0–160)
LYMPHOCYTES ABSOLUTE: NORMAL
LYMPHOCYTES RELATIVE PERCENT: NORMAL
MCH RBC QN AUTO: 31 PG
MCHC RBC AUTO-ENTMCNC: 35.6 G/DL
MCV RBC AUTO: 87.3 FL
MONOCYTES ABSOLUTE: NORMAL
MONOCYTES RELATIVE PERCENT: NORMAL
NEUTROPHILS ABSOLUTE: NORMAL
NEUTROPHILS RELATIVE PERCENT: NORMAL
NONHDLC SERPL-MCNC: 177 MG/DL
PHOSPHORUS: 3.9 MG/DL
PHOSPHORUS: NORMAL
PLATELET # BLD: 279 K/ΜL
PMV BLD AUTO: 11 FL
POTASSIUM SERPL-SCNC: 4.3 MMOL/L
PROTEIN TOTAL: 7 G/DL
RBC # BLD: 4.96 10^6/ΜL
SODIUM BLD-SCNC: 137 MMOL/L
TRIGL SERPL-MCNC: 152 MG/DL
URIC ACID: 7.4
VLDLC SERPL CALC-MCNC: 30.4 MG/DL
WBC # BLD: 5 10^3/ML

## 2021-07-23 PROBLEM — E66.09 CLASS 2 OBESITY DUE TO EXCESS CALORIES WITHOUT SERIOUS COMORBIDITY WITH BODY MASS INDEX (BMI) OF 37.0 TO 37.9 IN ADULT: Status: ACTIVE | Noted: 2021-07-23

## 2021-07-23 PROBLEM — E66.812 CLASS 2 OBESITY DUE TO EXCESS CALORIES WITHOUT SERIOUS COMORBIDITY WITH BODY MASS INDEX (BMI) OF 37.0 TO 37.9 IN ADULT: Status: ACTIVE | Noted: 2021-07-23

## 2021-07-23 NOTE — PROGRESS NOTES
2021    Braulio Fox (:  1990) is a 26 y.o. male, here for evaluation of the following chief complaint(s):  Blood Pressure Check (PT had health screening at work and BP was elevated)      ASSESSMENT/PLAN:     Diagnosis Orders   1. Routine general medical examination at a health care facility     2. Screening, lipid      he will bring employer numbers in next OV 4 weeks   3. Metabolic syndrome  Hemoglobin A1C    check a1c ; LCF advised   4. Class 2 obesity due to excess calories without serious comorbidity with body mass index (BMI) of 37.0 to 37.9 in adult      LCIF advised   5. Essential hypertension      new RX Lisinopril 10 mg daily rceheck 4 weeks       Return in about 4 weeks (around 2021) for HTN. An electronic signature was used to authenticate this note. @SIG@    SUBJECTIVE/OBJECTIVE:  (NOTE : prior results listed below reviewed at this visit to assist in medical decision making.)    HPI / ROS    # Preventive and other issues  # Lipids - recent screening history: LIPIDS DUE  Lab Results   Component Value Date    LDLCALC 140 07/15/2019     Lab Results   Component Value Date    TRIG 136 07/15/2019     Lab Results   Component Value Date    HDL 42 07/15/2019     Lab Results   Component Value Date    CHOL 209      # Metabolic Syndrome - check A1C today and reviewed need for lower carb dieting  Lab Results   Component Value Date    LABA1C 5.3 07/15/2019     No results found for: EAG    # HTN - lisa meds no CP/SOB  Lab Results   Component Value Date     07/15/2019    K 4.1 07/15/2019     07/15/2019    CO2 27 10/30/2016    BUN 7 07/15/2019    CREATININE 0.87 07/15/2019    GLUCOSE 88 07/15/2019    CALCIUM 9.7 07/15/2019       # Obesity - Reviewed prior weights as follows :     Wt Readings from Last 3 Encounters:   21 283 lb 6.4 oz (128.5 kg)   09/15/19 278 lb (126.1 kg)   19 275 lb 3.2 oz (124.8 kg)     Reviewed with pt lower carb diet with carb targets

## 2021-07-23 NOTE — PATIENT INSTRUCTIONS
Low Carb Eating with Intermittent Fasting    target < 100 grams of carb daily; ZERO grained based carbs  Get only carbs from whole fruits - strawberries, raspberries, blackberries, apples, oranges, pineapples, bananas etc.    Intermittent Fasting (\"I. F. \") / Eating Window   Only eat between noon and 8 PM  Water any time  Coffee with cream and no more than 1 teaspoon sugar OK in AM    Google/ YouTube AUTOPHAGY - a primary benefit of IF    Other helpful books and websites  1) Wheat Belly by Prem Jose MD (www.wheatMixbook. [employer])  2) The Primal Blueprint by Sharron Colby (www.[employer] - review success stories)  2) Living Paleo For Dummies

## 2021-07-26 ENCOUNTER — OFFICE VISIT (OUTPATIENT)
Dept: FAMILY MEDICINE CLINIC | Age: 31
End: 2021-07-26
Payer: COMMERCIAL

## 2021-07-26 VITALS
SYSTOLIC BLOOD PRESSURE: 149 MMHG | OXYGEN SATURATION: 97 % | HEIGHT: 72 IN | HEART RATE: 105 BPM | RESPIRATION RATE: 17 BRPM | BODY MASS INDEX: 38.39 KG/M2 | DIASTOLIC BLOOD PRESSURE: 98 MMHG | WEIGHT: 283.4 LBS

## 2021-07-26 DIAGNOSIS — Z00.00 ROUTINE GENERAL MEDICAL EXAMINATION AT A HEALTH CARE FACILITY: Primary | ICD-10-CM

## 2021-07-26 DIAGNOSIS — I10 ESSENTIAL HYPERTENSION: ICD-10-CM

## 2021-07-26 DIAGNOSIS — E66.09 CLASS 2 OBESITY DUE TO EXCESS CALORIES WITHOUT SERIOUS COMORBIDITY WITH BODY MASS INDEX (BMI) OF 37.0 TO 37.9 IN ADULT: ICD-10-CM

## 2021-07-26 DIAGNOSIS — E88.81 METABOLIC SYNDROME: ICD-10-CM

## 2021-07-26 DIAGNOSIS — Z13.220 SCREENING, LIPID: ICD-10-CM

## 2021-07-26 PROCEDURE — 99395 PREV VISIT EST AGE 18-39: CPT | Performed by: FAMILY MEDICINE

## 2021-07-26 RX ORDER — LISINOPRIL 10 MG/1
10 TABLET ORAL DAILY
Qty: 90 TABLET | Refills: 3 | Status: SHIPPED | OUTPATIENT
Start: 2021-07-26 | End: 2022-08-15

## 2021-07-26 ASSESSMENT — PATIENT HEALTH QUESTIONNAIRE - PHQ9
SUM OF ALL RESPONSES TO PHQ QUESTIONS 1-9: 0
2. FEELING DOWN, DEPRESSED OR HOPELESS: 0
SUM OF ALL RESPONSES TO PHQ9 QUESTIONS 1 & 2: 0
1. LITTLE INTEREST OR PLEASURE IN DOING THINGS: 0

## 2021-09-20 ENCOUNTER — TELEPHONE (OUTPATIENT)
Dept: FAMILY MEDICINE CLINIC | Age: 31
End: 2021-09-20

## 2021-09-20 NOTE — TELEPHONE ENCOUNTER
LOV 7/26/21  Future Appointments   Date Time Provider Piedad Vogel   10/1/2021  8:45 AM MD REMY Solorzano

## 2021-09-20 NOTE — TELEPHONE ENCOUNTER
----- Message from Velma Curtis sent at 9/20/2021 11:18 AM EDT -----  Subject: Refill Request    QUESTIONS  Name of Medication? lisinopril (PRINIVIL;ZESTRIL) 10 MG tablet  Patient-reported dosage and instructions? 10MG  How many days do you have left? 1  Preferred Pharmacy? [facility]  Pharmacy phone number (if available)? [phone]  Additional Information for Provider? Making appt today for f/u in Oct  ---------------------------------------------------------------------------  --------------  [address]  What is the best way for the office to contact you? Do not leave any   message, patient will call back for answer  Preferred Call Back Phone Number?  [phone]

## 2021-10-20 PROBLEM — K21.9 GASTROESOPHAGEAL REFLUX DISEASE: Status: ACTIVE | Noted: 2021-10-20

## 2021-10-21 NOTE — PROGRESS NOTES
10/22/2021    Maria D Fregoso (:  1990) is a 33 y.o. male, here for evaluation of the following chief complaint(s):  Check-Up (BP)      ASSESSMENT/PLAN:     Diagnosis Orders   1. Essential hypertension      at goal cont check renal   2. Class 2 obesity due to excess calories without serious comorbidity with body mass index (BMI) of 37.0 to 37.9 in adult      LCIF advised   3. Gastroesophageal reflux disease, unspecified whether esophagitis present      OK PPI continue   4. Need for COVID-19 vaccine      advised to get       Return in about 6 months (around 2022) for Adult Preventive, HTN, GERD, screen lipid, Metabolic syndrome, Obesity. An electronic signature was used to authenticate this note. @SIG@    SUBJECTIVE/OBJECTIVE:  (NOTE : prior results listed below reviewed at this visit to assist in medical decision making.)    HPI / ROS    # HTN - lisa meds no CP/SOB  BP Readings from Last 3 Encounters:   10/22/21 136/84   21 (!) 149/98   09/15/19 (!) 131/92     Lab Results   Component Value Date     2021    K 4.3 2021     2021    CO2 27 10/30/2016    BUN 10 2021    CREATININE 0.96 2021    GLUCOSE 82 2021    CALCIUM 9.5 2021       Started lisinopril 10 mg 3 mos ago here for recheck    # GERD - OK on Proton Pump Inhibitor (PPI) per pt; no pain, cough or acid reflux taste    # Obesity - Reviewed prior weights as follows :     Wt Readings from Last 3 Encounters:   10/22/21 285 lb (129.3 kg)   21 283 lb 6.4 oz (128.5 kg)   09/15/19 278 lb (126.1 kg)     Reviewed with pt lower carb diet with carb targets to help with insulin effects on weight storage; resources advised      Wt Readings from Last 3 Encounters:   10/22/21 285 lb (129.3 kg)   21 283 lb 6.4 oz (128.5 kg)   09/15/19 278 lb (126.1 kg)       BP Readings from Last 3 Encounters:   10/22/21 136/84   21 (!) 149/98   09/15/19 (!) 131/92       PHYSICAL EXAM  Vitals: 10/22/21 1441   BP: 136/84   Pulse: 81   Resp: 18   SpO2: 99%   Weight: 285 lb (129.3 kg)   Height: 6' (1.829 m)     A&o  Neck no TMG no bruit  Car reg no MGR  Lungs cta  Ext no edema  Skin no jaundice  Eyes anicteric

## 2021-10-22 ENCOUNTER — OFFICE VISIT (OUTPATIENT)
Dept: FAMILY MEDICINE CLINIC | Age: 31
End: 2021-10-22
Payer: COMMERCIAL

## 2021-10-22 VITALS
SYSTOLIC BLOOD PRESSURE: 136 MMHG | HEIGHT: 72 IN | RESPIRATION RATE: 18 BRPM | WEIGHT: 285 LBS | BODY MASS INDEX: 38.6 KG/M2 | HEART RATE: 81 BPM | DIASTOLIC BLOOD PRESSURE: 84 MMHG | OXYGEN SATURATION: 99 %

## 2021-10-22 DIAGNOSIS — E66.09 CLASS 2 OBESITY DUE TO EXCESS CALORIES WITHOUT SERIOUS COMORBIDITY WITH BODY MASS INDEX (BMI) OF 37.0 TO 37.9 IN ADULT: ICD-10-CM

## 2021-10-22 DIAGNOSIS — Z23 NEED FOR COVID-19 VACCINE: ICD-10-CM

## 2021-10-22 DIAGNOSIS — K21.9 GASTROESOPHAGEAL REFLUX DISEASE, UNSPECIFIED WHETHER ESOPHAGITIS PRESENT: ICD-10-CM

## 2021-10-22 DIAGNOSIS — I10 ESSENTIAL HYPERTENSION: Primary | ICD-10-CM

## 2021-10-22 PROCEDURE — 99213 OFFICE O/P EST LOW 20 MIN: CPT | Performed by: FAMILY MEDICINE

## 2021-10-22 SDOH — ECONOMIC STABILITY: FOOD INSECURITY: WITHIN THE PAST 12 MONTHS, THE FOOD YOU BOUGHT JUST DIDN'T LAST AND YOU DIDN'T HAVE MONEY TO GET MORE.: NEVER TRUE

## 2021-10-22 SDOH — ECONOMIC STABILITY: FOOD INSECURITY: WITHIN THE PAST 12 MONTHS, YOU WORRIED THAT YOUR FOOD WOULD RUN OUT BEFORE YOU GOT MONEY TO BUY MORE.: NEVER TRUE

## 2021-10-22 ASSESSMENT — SOCIAL DETERMINANTS OF HEALTH (SDOH): HOW HARD IS IT FOR YOU TO PAY FOR THE VERY BASICS LIKE FOOD, HOUSING, MEDICAL CARE, AND HEATING?: NOT HARD AT ALL

## 2021-10-22 NOTE — PATIENT INSTRUCTIONS
Low Carb Eating with Intermittent Fasting    target < 100 grams of carb daily; ZERO grained based carbs  Get only carbs from whole fruits - strawberries, raspberries, blackberries, apples, oranges, pineapples, bananas etc.    Intermittent Fasting (\"I. F. \") / Eating Window   Only eat between noon and 8 PM  Water any time  Coffee with cream and no more than 1 teaspoon sugar OK in AM    Google/ YouTube AUTOPHAGY - a primary benefit of IF    Other helpful books and websites  1) Wheat Belly by Jovany Henry MD (www.[employer]. [employer])  2) The Primal Blueprint by Luisa Shahid (www.[employer] - review success stories)  2) Living Paleo For Dummies

## 2021-12-20 ENCOUNTER — TELEPHONE (OUTPATIENT)
Dept: FAMILY MEDICINE CLINIC | Age: 31
End: 2021-12-20

## 2021-12-22 ENCOUNTER — APPOINTMENT (OUTPATIENT)
Dept: GENERAL RADIOLOGY | Age: 31
End: 2021-12-22
Payer: COMMERCIAL

## 2021-12-22 LAB
ANION GAP SERPL CALCULATED.3IONS-SCNC: 11 MMOL/L (ref 3–16)
BASOPHILS ABSOLUTE: 0 K/UL (ref 0–0.2)
BASOPHILS RELATIVE PERCENT: 0.3 %
BUN BLDV-MCNC: 11 MG/DL (ref 7–20)
CALCIUM SERPL-MCNC: 9.2 MG/DL (ref 8.3–10.6)
CHLORIDE BLD-SCNC: 101 MMOL/L (ref 99–110)
CO2: 25 MMOL/L (ref 21–32)
CREAT SERPL-MCNC: 0.7 MG/DL (ref 0.9–1.3)
EOSINOPHILS ABSOLUTE: 0.1 K/UL (ref 0–0.6)
EOSINOPHILS RELATIVE PERCENT: 2.3 %
GFR AFRICAN AMERICAN: >60
GFR NON-AFRICAN AMERICAN: >60
GLUCOSE BLD-MCNC: 101 MG/DL (ref 70–99)
HCT VFR BLD CALC: 44.6 % (ref 40.5–52.5)
HEMOGLOBIN: 15.3 G/DL (ref 13.5–17.5)
LYMPHOCYTES ABSOLUTE: 2.6 K/UL (ref 1–5.1)
LYMPHOCYTES RELATIVE PERCENT: 42.9 %
MCH RBC QN AUTO: 30.5 PG (ref 26–34)
MCHC RBC AUTO-ENTMCNC: 34.4 G/DL (ref 31–36)
MCV RBC AUTO: 88.5 FL (ref 80–100)
MONOCYTES ABSOLUTE: 0.7 K/UL (ref 0–1.3)
MONOCYTES RELATIVE PERCENT: 11.8 %
NEUTROPHILS ABSOLUTE: 2.6 K/UL (ref 1.7–7.7)
NEUTROPHILS RELATIVE PERCENT: 42.7 %
PDW BLD-RTO: 12.7 % (ref 12.4–15.4)
PLATELET # BLD: 269 K/UL (ref 135–450)
PMV BLD AUTO: 8.5 FL (ref 5–10.5)
POTASSIUM REFLEX MAGNESIUM: 3.6 MMOL/L (ref 3.5–5.1)
RBC # BLD: 5.03 M/UL (ref 4.2–5.9)
SODIUM BLD-SCNC: 137 MMOL/L (ref 136–145)
TROPONIN: <0.01 NG/ML
WBC # BLD: 6 K/UL (ref 4–11)

## 2021-12-22 PROCEDURE — 99283 EMERGENCY DEPT VISIT LOW MDM: CPT

## 2021-12-22 PROCEDURE — 85025 COMPLETE CBC W/AUTO DIFF WBC: CPT

## 2021-12-22 PROCEDURE — 36415 COLL VENOUS BLD VENIPUNCTURE: CPT

## 2021-12-22 PROCEDURE — 84484 ASSAY OF TROPONIN QUANT: CPT

## 2021-12-22 PROCEDURE — 93005 ELECTROCARDIOGRAM TRACING: CPT | Performed by: EMERGENCY MEDICINE

## 2021-12-22 PROCEDURE — 80048 BASIC METABOLIC PNL TOTAL CA: CPT

## 2021-12-22 PROCEDURE — 71046 X-RAY EXAM CHEST 2 VIEWS: CPT

## 2021-12-22 ASSESSMENT — PAIN SCALES - GENERAL: PAINLEVEL_OUTOF10: 4

## 2021-12-23 ENCOUNTER — HOSPITAL ENCOUNTER (EMERGENCY)
Age: 31
Discharge: HOME | End: 2021-12-23
Attending: EMERGENCY MEDICINE
Payer: COMMERCIAL

## 2021-12-23 VITALS
WEIGHT: 281.31 LBS | HEART RATE: 74 BPM | BODY MASS INDEX: 38.1 KG/M2 | SYSTOLIC BLOOD PRESSURE: 132 MMHG | OXYGEN SATURATION: 96 % | DIASTOLIC BLOOD PRESSURE: 77 MMHG | RESPIRATION RATE: 12 BRPM | TEMPERATURE: 97.6 F | HEIGHT: 72 IN

## 2021-12-23 DIAGNOSIS — M79.604 RIGHT LEG PAIN: ICD-10-CM

## 2021-12-23 DIAGNOSIS — R07.89 MUSCULOSKELETAL CHEST PAIN: Primary | ICD-10-CM

## 2021-12-23 LAB
D DIMER: <200 NG/ML DDU (ref 0–229)
EKG ATRIAL RATE: 111 BPM
EKG DIAGNOSIS: NORMAL
EKG P AXIS: 34 DEGREES
EKG P-R INTERVAL: 154 MS
EKG Q-T INTERVAL: 342 MS
EKG QRS DURATION: 106 MS
EKG QTC CALCULATION (BAZETT): 465 MS
EKG R AXIS: 0 DEGREES
EKG T AXIS: 23 DEGREES
EKG VENTRICULAR RATE: 111 BPM

## 2021-12-23 PROCEDURE — 36415 COLL VENOUS BLD VENIPUNCTURE: CPT

## 2021-12-23 PROCEDURE — 93010 ELECTROCARDIOGRAM REPORT: CPT | Performed by: INTERNAL MEDICINE

## 2021-12-23 PROCEDURE — 85379 FIBRIN DEGRADATION QUANT: CPT

## 2021-12-23 RX ORDER — METHYLPREDNISOLONE 4 MG/1
TABLET ORAL
Qty: 1 KIT | Refills: 0 | Status: SHIPPED | OUTPATIENT
Start: 2021-12-23 | End: 2021-12-29

## 2021-12-23 NOTE — ED PROVIDER NOTES
Attending Note:    The patient was seen and examined by the mid-level provider. I also completed a face-to-face examination. HPI: Maria D Fregoso is a 33 y.o. male who presents to the emergency department with a complaint of right posterior leg pain in the right hamstring area. He states that he first noticed this on the evening of November 12. He had gotten his second Covid vaccine earlier in the day. He states that he has had a persistent burning pain in the right posterior thigh since that time. It is unrelated to position but he seems to notice it more when he is standing at work. He did some reading online and was concerned that this could be a blood clot prompting him to come to the hospital.  He denies any chest pain heaviness pressure or tightness. He denies any shortness of breath. He denies any diaphoresis or dyspnea on exertion. He denies any fever or chills. He does have a history of some chronic low back pain which is worse with position. However, he denies any radicular pain from the back, saddle anesthesia, incontinence, bowel or bladder difficulties. He denies any groin or testicular pain. He denies any anterior thigh pain or calf pain. He denies any leg swelling. He denies any history of sciatica. He does not recall seeing any rash on the leg when his symptoms began. He denies any personal or family history of thromboembolic disease. He does not smoke. He does report a history of hypertension and borderline cholesterol but does not take medication for cholesterol. He denies any history of diabetes. He denies any personal history of coronary artery disease. He states that his grandfather did have heart disease. He does report also that he occasionally gets a sharp twinge of pain along the right sternal border that is always in the same location. He points to the right third rib at the costosternal border.   He states that this typically occurs with certain positions at work when he lifts something heavy. He denies any exertional chest discomfort. He states that the \"twinge of pain\" typically lasts 1 to 2 seconds at the most.  It occurs randomly and has not occurred for several days. No current chest discomfort. Does report that he has a history of anxiety and feels that that is likely playing a role in his symptoms. Physical Exam:     Constitutional: No apparent distress. Pleasant. Appears comfortable. Head: No visible evidence of trauma. Normocephalic. Eyes: Pupils equal and reactive. No photophobia. Conjunctiva normal.    HENT: Oral mucosa moist.  Airway patent. Neck:  Soft and supple. Nontender. Heart:  Regular rate and rhythm. No murmur. Minimal tenderness along the right sternal border at the right third rib. No crepitance deformity or step-off. No rash. Lungs:  Clear to auscultation. No wheezes, rales, or ronchi. No conversational dyspnea or accessory muscle use. Abdomen:  Soft, non-distended. Nontender. No guarding rigidity or rebound. Musculoskeletal: Extremities non-tender with full range of motion. Right lower extremity was nontender. No visible rash. No groin tenderness thigh tenderness or calf tenderness. No edema. Radial and dorsalis pedis pulses were equal bilaterally. Capillary refill less than 2 seconds in all digits. Negative straight leg raising. No radicular pain. Thoracic and lumbar spine were nontender. No point tenderness or step-off. Full range of motion without discomfort. Mild tenderness in the right and left lower lumbar area in the paravertebral musculature. No spasm. Neurological: Alert and oriented x 3. Speech clear. No acute focal motor or sensory deficits. No dysarthria. No aphasia. No pronator drift. No facial droop. Great toe extensor strength was 5/5 bilaterally. The patient was able to flex and extend the hip and knee bilaterally against resistance with strength 5/5.   Deep tendon reflexes were 2/4 in the patellar and Achilles tendons bilaterally. No saddle anesthesia. No rash. Negative straight leg raising. No radicular pain. Gait was steady. Skin: Skin is warm and dry. No rash. Psychiatric: Normal mood and affect. Behavior is normal.     DIAGNOSTIC RESULTS     EKG: All EKG's are interpreted by the Emergency Department Physician who either signs or Co-signs this chart in the absence of a cardiologist.    Sinus tachycardia. Rate 111. MD interval 154 ms. QRS duration 106 ms. QTc 465 ms. Axis 0 degrees. No ST elevation. EKG was unchanged from prior tracing from October 30, 2016. RADIOLOGY:   Non-plain film images such as CT, Ultrasound and MRI are read by the radiologist. Plain radiographic images are visualized and preliminarily interpreted by the emergency physician with the below findings:        Interpretation per the Radiologist below, if available at the time of this note:    XR CHEST (2 VW)   Final Result   No acute cardiopulmonary disease.                ED BEDSIDE ULTRASOUND:   Performed by ED Physician - none    LABS:  Labs Reviewed   BASIC METABOLIC PANEL W/ REFLEX TO MG FOR LOW K - Abnormal; Notable for the following components:       Result Value    Glucose 101 (*)     CREATININE 0.7 (*)     All other components within normal limits    Narrative:     Performed at:  [facility]  [address] [place], [place] [address]   Phone [phone]   CBC WITH AUTO DIFFERENTIAL    Narrative:     Performed at:  [facility]  [address] [place], [place] [address]   Phone [phone]   TROPONIN    Narrative:     Performed at:  [facility]  [address] [place], [place] [address]   Phone [phone]   D-DIMER, QUANTITATIVE    Narrative:     Performed at:  [facility]  [address] [place], [place] [address]   Phone [phone]       All other labs were within normal range or not returned as of this dictation. EMERGENCY DEPARTMENT COURSE and DIFFERENTIAL DIAGNOSIS/MDM:   Vitals:    Vitals:    12/22/21 2103 12/23/21 0100 12/23/21 0130 12/23/21 0200   BP: (!) 165/87 133/89 (!) 147/92 132/77   Pulse: 117 84 88 74   Resp: 18 10 14 12   Temp: 97.6 °F (36.4 °C)      TempSrc: Tympanic      SpO2: 100% 97% 97% 96%   Weight: 281 lb 4.9 oz (127.6 kg)      Height: 6' (1.829 m)          Patient presents with right posterior leg pain described as burning. Skin is normal in appearance. No evidence of rash. Differential diagnosis would include postherpetic neuralgia. It is possible that he may have had shingles and the rest went unnoticed and now has some postherpetic pain. Sciatica is certainly a possibility as well. He does have a history of some low back pain. There is no evidence of cauda equina syndrome. He is neurologically intact. No suspicion for acute coronary syndrome. EKG is unremarkable. He was initially mildly tachycardic but repeat heart rate is in the 70s. Pain is reproducible with palpation and only last 1 to 2 seconds each time when it occurs. D-dimer is normal.  No evidence of DVT. Patient was concerned about this and still wishes to have an ultrasound completed. This is not available at this hour. No clinical evidence or suspicion for DVT on exam.  Outpatient order for venous Doppler was given. I advised him to follow-up with his primary care physician in 1 to 2 days for reexamination. If symptoms persist he may need further evaluation with EMG and/or MRI lumbar spine. He will be placed on a Medrol Dosepak. If his condition worsens or new symptoms develop, he was advised to return immediately to the emergency department. MDM      CRITICAL CARE TIME   Total Critical Care time was 0 minutes, excluding separately reportable procedures.   There was a high probability of clinically significant/life threatening deterioration in the patient's condition which required my urgent intervention. CONSULTS:  None    PROCEDURES:  Unless otherwise noted below, none     Procedures        FINAL IMPRESSION      1. Musculoskeletal chest pain    2. Right leg pain          DISPOSITION/PLAN   DISPOSITION        PATIENT REFERRED TO:  No follow-up provider specified. DISCHARGE MEDICATIONS:  New Prescriptions    METHYLPREDNISOLONE (MEDROL, JARRET,) 4 MG TABLET    Take by mouth. Controlled Substances Monitoring:     No flowsheet data found. (Please note that portions of this note were completed with a voice recognition program.  Efforts were made to edit the dictations but occasionally words are mis-transcribed. )    [id number] Asa Ramos DO (electronically signed)  Attending Emergency Physician      Gary Trivedi DO  12/23/21 [id number]

## 2021-12-24 NOTE — ED PROVIDER NOTES
[address] [facility]        Pt Name: Ryan Ta  MRN: [id number]  [place] 1990  Date of evaluation: 12/22/2021  Provider: SARA Moody - TESSA  PCP: Juanita Canada MD  Note Started: 11:13 AM EST        I have seen and evaluated this patient with my supervising physician, Dr. Roselia Tatum. CHIEF COMPLAINT       Chief Complaint   Patient presents with    Leg Pain     right leg pain    Chest Pain     intermittent since November       HISTORY OF PRESENT ILLNESS   (Location, Timing/Onset, Context/Setting, Quality, Duration, Modifying Factors, Severity, Associated Signs and Symptoms)  Note limiting factors. Chief Complaint: Substernal chest and leg pain x2 weeks with concern for DVT. Ryan Ta is a 33 y.o. nontoxic, well-appearing male in no acute distress who presents to the emergency department with concern for blood clots status post he received COVID-19 vaccination on 11/12/2021 to begin with intermittent nonradiating substernal chest pain and posterior right upper leg pain approximately 2 weeks ago. He does not have the chest pain presently but describes the previous incidents as \"pressure\". He is presently experiencing the right leg pain which she describes as \"throbbing\" with a severity of 4/10. He endorses exacerbation with standing and usually after work when he gets home and relaxes. He denies ripping or tearing sensation, nausea, vomiting, dizziness, presyncope, shortness of breath, diaphoresis, fevers, chills, cough, change in smell or taste, calf pain or swelling, abdominal pain, diarrhea, urinary symptoms. He does not have a history of blood clots. His initial heart rate is 111 bpm.    Nursing Notes were all reviewed and agreed with or any disagreements were addressed in the HPI.     REVIEW OF SYSTEMS    (2-9 systems for level 4, 10 or more for level 5)     Review of Systems    Positives and Pertinent negatives as per HPI. Except as noted above in the ROS, all other systems were reviewed and negative. PAST MEDICAL HISTORY     Past Medical History:   Diagnosis Date    GERD (gastroesophageal reflux disease)     Hypertension          SURGICAL HISTORY     Past Surgical History:   Procedure Laterality Date    WISDOM TOOTH EXTRACTION           CURRENTMEDICATIONS       Discharge Medication List as of 12/23/2021  3:14 AM      CONTINUE these medications which have NOT CHANGED    Details   lisinopril (PRINIVIL;ZESTRIL) 10 MG tablet Take 1 tablet by mouth daily, Disp-90 tablet, R-3Normal      omeprazole (PRILOSEC OTC) 20 MG tablet Take 20 mg by mouth daily. ALLERGIES     Patient has no known allergies.     FAMILYHISTORY       Family History   Problem Relation Age of Onset    Cancer Maternal Grandfather     Diabetes Paternal Grandfather     Cancer Paternal Grandfather         pancreas          SOCIAL HISTORY       Social History     Tobacco Use    Smoking status: Former Smoker    Smokeless tobacco: Never Used   Substance Use Topics    Alcohol use: Yes     Comment: once in a while    Drug use: No       SCREENINGS             PHYSICAL EXAM    (up to 7 for level 4, 8 or more for level 5)     ED Triage Vitals [12/22/21 2103]   BP Temp Temp Source Pulse Resp SpO2 Height Weight   (!) 165/87 97.6 °F (36.4 °C) Tympanic 117 18 100 % 6' (1.829 m) 281 lb 4.9 oz (127.6 kg)       Physical Exam    DIAGNOSTIC RESULTS   LABS:    Labs Reviewed   BASIC METABOLIC PANEL W/ REFLEX TO MG FOR LOW K - Abnormal; Notable for the following components:       Result Value    Glucose 101 (*)     CREATININE 0.7 (*)     All other components within normal limits    Narrative:     Performed at:  [facility]  [address] [place], [place] [address]   Phone [phone]   CBC WITH AUTO DIFFERENTIAL    Narrative:     Performed at:  [facility]  [address] [facility], [place] [address]   Phone [phone]   TROPONIN    Narrative:     Performed at:  [facility]  [address] [facility] [place] [address]   Phone [phone]   D-DIMER, QUANTITATIVE    Narrative:     Performed at:  [facility]  [address] [facility] [place] [address]   Phone [phone]       When ordered only abnormal lab results are displayed. All other labs were within normal range or not returned as of this dictation. EKG: When ordered, EKG's are interpreted by the Emergency Department Physician in the absence of a cardiologist.  Please see their note for interpretation of EKG. RADIOLOGY:   Non-plain film images such as CT, Ultrasound and MRI are read by the radiologist. Plain radiographic images are visualized and preliminarily interpreted by the ED Provider with the below findings:        Interpretation per the Radiologist below, if available at the time of this note:    XR CHEST (2 VW)   Final Result   No acute cardiopulmonary disease. VL Extremity Venous Right    (Results Pending)     XR CHEST (2 VW)    Result Date: 12/22/2021  EXAMINATION: TWO XRAY VIEWS OF THE CHEST 12/22/2021 9:17 pm COMPARISON: None. HISTORY: ORDERING SYSTEM PROVIDED HISTORY: Chest Pain TECHNOLOGIST PROVIDED HISTORY: Reason for exam:->Chest Pain Reason for Exam: chest and leg pain FINDINGS: The cardiomediastinal silhouette is of normal size. The lungs are grossly clear. There is no pleural effusion. There is no pneumothorax. There is no acute osseous abnormality. No acute cardiopulmonary disease.            PROCEDURES   Unless otherwise noted below, none     Procedures    CRITICAL CARE TIME   N/A    CONSULTS:  None      EMERGENCY DEPARTMENT COURSE and DIFFERENTIAL DIAGNOSIS/MDM:     Patient presents to the emergency department with concern for blood clots status post received his COVID-19 vaccination and began experiencing intermittent substernal chest pain and right leg pain. Alternate diagnoses were considered but less likely based on history and physical.  Considered acute coronary syndrome, pulmonary embolism, COPD/asthma, pneumonia, musculoskeletal, reflux/PUD/gastritis, pneumothorax, CHF, thoracic aortic dissection, anxiety, among others. Patient is concerned for and describes nonpleuritic chest discomfort but does have upper right leg pain and was tachycardic on arrival.  Therefore cardiac work-up including a D-dimer was obtained. The EKG reveals no ischemic EKG changes and there was no elevation in troponin. D-dimer was not elevated/<200. BMP negative for electrolyte derangement or renal dysfunction but with mild hyperglycemia at 101 mg/dL but otherwise unremarkable. CBC reveals no leukocytosis or anemia/unremarkable; CXR reveals no acute cardiopulmonary disease. Patient remains very concerned about blood clots and despite the fact that his D-dimer level was not elevated request that the EMD order an outpatient vascular study to confirm he has no RLE DVT. The vascular study was ordered for outpatient. Patient was placed on a Medrol Dosepak and discharged home with strict return precautions and instructions to follow-up with his PCP in the next 1-2 days. Patient verbalized understanding is agreeable to plan for discharge and follow-up. Vitals:    Vitals:    12/22/21 2103 12/23/21 0100 12/23/21 0130 12/23/21 0200   BP: (!) 165/87 133/89 (!) 147/92 132/77   Pulse: 117 84 88 74   Resp: 18 10 14 12   Temp: 97.6 °F (36.4 °C)      TempSrc: Tympanic      SpO2: 100% 97% 97% 96%   Weight: 281 lb 4.9 oz (127.6 kg)      Height: 6' (1.829 m)          Patient was given the following medications:  Medications - No data to display          FINAL IMPRESSION      1. Musculoskeletal chest pain    2.  Right leg pain          DISPOSITION/PLAN   DISPOSITION Decision to Discharge    PATIENT REFERRED TO:  MD Will Jamil Maximilian Jerez Charline [place] [address] [address]  [phone]    Call in 1 day      [facility] Department  [address] [address]  [phone]  Go to   As needed      DISCHARGE MEDICATIONS:  Discharge Medication List as of 12/23/2021  3:14 AM      START taking these medications    Details   methylPREDNISolone (MEDROL, JARRET,) 4 MG tablet Take by mouth., Disp-1 kit, R-0Normal             DISCONTINUED MEDICATIONS:  Discharge Medication List as of 12/23/2021  3:14 AM                 (Please note that portions of this note were completed with a voice recognition program.  Efforts were made to edit the dictations but occasionally words are mis-transcribed.)    SARA Marcos - CNP (electronically signed)          SARA Marcos - [place]ES[place]  12/24/21 1124

## 2022-03-02 ENCOUNTER — TELEMEDICINE (OUTPATIENT)
Dept: FAMILY MEDICINE CLINIC | Age: 32
End: 2022-03-02
Payer: COMMERCIAL

## 2022-03-02 DIAGNOSIS — J01.90 ACUTE SINUSITIS, RECURRENCE NOT SPECIFIED, UNSPECIFIED LOCATION: Primary | ICD-10-CM

## 2022-03-02 PROCEDURE — 99213 OFFICE O/P EST LOW 20 MIN: CPT | Performed by: FAMILY MEDICINE

## 2022-03-02 RX ORDER — AZITHROMYCIN 250 MG/1
TABLET, FILM COATED ORAL
Qty: 1 PACKET | Refills: 0 | Status: SHIPPED | OUTPATIENT
Start: 2022-03-02 | End: 2022-03-12

## 2022-03-02 RX ORDER — PREDNISONE 10 MG/1
10 TABLET ORAL 2 TIMES DAILY
Qty: 10 TABLET | Refills: 0 | Status: SHIPPED | OUTPATIENT
Start: 2022-03-02 | End: 2022-03-07

## 2022-03-02 NOTE — PROGRESS NOTES
3/2/2022    Steven Zuniga (:  1990) is a 33 y.o. male, here for evaluation of the following chief complaint(s):  No chief complaint on file. ASSESSMENT/PLAN:     Diagnosis Orders   1. Acute sinusitis, recurrence not specified, unspecified location  azithromycin (ZITHROMAX Z-JARRET) 250 MG tablet    z-pack prednisone test once more in AM if negative may go to work  tomorrow. No follow-ups on file. An electronic signature was used to authenticate this note. @SIG@    SUBJECTIVE/OBJECTIVE:  (NOTE : prior results listed below reviewed at this visit to assist in medical decision making.)    HPI / ROS    # acute for cough congestion x 2 days states home COVID test negative requests work note. Dry cough sore throat prior weekend. Has been vaccinated. Wt Readings from Last 3 Encounters:   21 281 lb 4.9 oz (127.6 kg)   10/22/21 285 lb (129.3 kg)   21 283 lb 6.4 oz (128.5 kg)       BP Readings from Last 3 Encounters:   21 132/77   10/22/21 136/84   21 (!) 149/98         TELEHEALTH EVALUATION -- Audio/Visual (During [facility] public health emergency)      Steven Zuniga (:  1990) is being evaluated by a Virtual Visit (video visit) encounter to address concerns as mentioned above. A caregiver was present when appropriate. Due to this being a TeleHealth encounter (During [facility]-37 public health emergency), evaluation of the following organ systems was limited: Vitals/Constitutional/EENT/Resp/CV/GI//MS/Neuro/Skin/Heme-Lymph-Imm. Pursuant to the emergency declaration under the [facility] authority and the [employer] Act, this Virtual Visit was conducted with patient's (and/or legal guardian's) consent, to reduce the patient's risk of exposure to COVID-19 and provide necessary medical care.   The patient (and/or legal guardian) has also been advised to contact this office for worsening conditions or problems, and seek emergency medical treatment and/or call 911 if deemed necessary. Patient identification was verified at the start of the visit: Yes    Total time spent on this encounter: Not billed by time    Services were provided through a video synchronous discussion virtually to substitute for in-person clinic visit. Patient and provider were located at their individual homes. --Moustapha Segundo MD on 3/2/2022 at 12:24 PM    An electronic signature was used to authenticate this note.

## 2022-03-02 NOTE — LETTER
[address], [address] [address]  Phone: [phone]  Fax: [phone]    Rodney Alvarez MD        March 2, 2022    [place] [address]      Dear Beatriz Dunn:    OK to return to work full duties [address] [employer] [facility] [employer] [facility] 2022. COVID testing is negative. If you have any questions or concerns, please don't hesitate to call.     Sincerely,        Rodney Alvarez MD

## 2022-08-15 RX ORDER — LISINOPRIL 10 MG/1
TABLET ORAL
Qty: 90 TABLET | Refills: 3 | Status: SHIPPED | OUTPATIENT
Start: 2022-08-15

## 2022-10-24 ENCOUNTER — OFFICE VISIT (OUTPATIENT)
Dept: FAMILY MEDICINE CLINIC | Age: 32
End: 2022-10-24
Payer: COMMERCIAL

## 2022-10-24 VITALS
DIASTOLIC BLOOD PRESSURE: 88 MMHG | SYSTOLIC BLOOD PRESSURE: 134 MMHG | WEIGHT: 277 LBS | HEART RATE: 90 BPM | OXYGEN SATURATION: 98 % | BODY MASS INDEX: 37.57 KG/M2 | RESPIRATION RATE: 16 BRPM

## 2022-10-24 DIAGNOSIS — K21.9 GASTROESOPHAGEAL REFLUX DISEASE, UNSPECIFIED WHETHER ESOPHAGITIS PRESENT: ICD-10-CM

## 2022-10-24 DIAGNOSIS — I10 ESSENTIAL HYPERTENSION: ICD-10-CM

## 2022-10-24 DIAGNOSIS — Z00.00 ROUTINE GENERAL MEDICAL EXAMINATION AT A HEALTH CARE FACILITY: Primary | ICD-10-CM

## 2022-10-24 DIAGNOSIS — Z13.220 SCREENING, LIPID: ICD-10-CM

## 2022-10-24 PROBLEM — E88.81 METABOLIC SYNDROME: Status: ACTIVE | Noted: 2022-10-24

## 2022-10-24 PROBLEM — E88.810 METABOLIC SYNDROME: Status: ACTIVE | Noted: 2022-10-24

## 2022-10-24 PROCEDURE — 99395 PREV VISIT EST AGE 18-39: CPT | Performed by: FAMILY MEDICINE

## 2022-10-24 ASSESSMENT — PATIENT HEALTH QUESTIONNAIRE - PHQ9
SUM OF ALL RESPONSES TO PHQ QUESTIONS 1-9: 0
SUM OF ALL RESPONSES TO PHQ QUESTIONS 1-9: 0
2. FEELING DOWN, DEPRESSED OR HOPELESS: 0
1. LITTLE INTEREST OR PLEASURE IN DOING THINGS: 0
SUM OF ALL RESPONSES TO PHQ QUESTIONS 1-9: 0
SUM OF ALL RESPONSES TO PHQ9 QUESTIONS 1 & 2: 0
SUM OF ALL RESPONSES TO PHQ QUESTIONS 1-9: 0

## 2022-10-24 NOTE — PROGRESS NOTES
10/24/2022    Bharath Paredes (:  1990) is a 27 y.o. male, here for evaluation of the following chief complaint(s):  Hypertension      ASSESSMENT/PLAN:     Diagnosis Orders   1. Routine general medical examination at a health care facility        2. Screening, lipid      will review labs outside lab      3. Essential hypertension      at goal cont meds check renal from labas done outside      4. Gastroesophageal reflux disease, unspecified whether esophagitis present      ok ppi cont          Return in about 6 months (around 2023) for HTN, Metabolic syndrome. An electronic signature was used to authenticate this note.     SUBJECTIVE/OBJECTIVE:  (NOTE : prior results listed below reviewed at this visit to assist in medical decision making.)    HPI / ROS    # Preventive and other issues  # Lipids - recent screening history:  Lab Results   Component Value Date    LDLCALC 149 2021     Lab Results   Component Value Date    TRIG 152 2021     Lab Results   Component Value Date    HDL 43 2021     Lab Results   Component Value Date    CHOL 220      # Metabolic Syndrome - check A1C today and reviewed need for lower carb dieting  Lab Results   Component Value Date    LABA1C 5.1 2021     No results found for: EAG    # HTN - lisa meds no CP/SOB  BP Readings from Last 3 Encounters:   10/24/22 134/88   21 132/77   10/22/21 136/84     Lab Results   Component Value Date/Time     2021 09:21 PM    K 3.6 2021 09:21 PM     2021 09:21 PM    CO2 25 2021 09:21 PM    BUN 11 2021 09:21 PM    CREATININE 0.7 2021 09:21 PM    GLUCOSE 101 2021 09:21 PM    CALCIUM 9.2 2021 09:21 PM       # GERD - OK on Proton Pump Inhibitor (PPI) per pt; no pain, cough or acid reflux taste          Wt Readings from Last 3 Encounters:   10/24/22 277 lb (125.6 kg)   21 281 lb 4.9 oz (127.6 kg)   10/22/21 285 lb (129.3 kg)       BP Readings from Last 3 Encounters:   10/24/22 134/88   12/23/21 132/77   10/22/21 136/84       PHYSICAL EXAM  Vitals:    10/24/22 0822   BP: 134/88   Site: Left Upper Arm   Position: Sitting   Cuff Size: Large Adult   Pulse: 90   Resp: 16   SpO2: 98%   Weight: 277 lb (125.6 kg)     A&o  Neck no TMG no bruit  Car reg no MGR  Lungs cta  Ext no edema  Skin no jaundice  Eyes anicteric

## 2022-10-24 NOTE — PATIENT INSTRUCTIONS
Low Carb Eating with Intermittent Fasting    target < 100 grams of carb daily; ZERO grained based carbs  Get only carbs from whole fruits - strawberries, raspberries, blackberries, apples, oranges, pineapples, bananas etc.    Intermittent Fasting (\"I. F. \") / Eating Window   Only eat between noon and 8 PM  Water any time  Coffee with cream and no more than 1 teaspoon sugar OK in AM    Google/ YouTube AUTOPHAGY - a primary benefit of IF    Other helpful books and websites  1) Wheat Belly by Roger Lofton MD (www.[employer]. [employer])  2) The Primal Blueprint by Carlee Hernandez (www.[employer] - review success stories)  2) Living Paleo For Dummies

## 2023-01-06 ENCOUNTER — TELEPHONE (OUTPATIENT)
Dept: FAMILY MEDICINE CLINIC | Age: 33
End: 2023-01-06

## 2023-01-06 RX ORDER — LISINOPRIL 10 MG/1
TABLET ORAL
Qty: 90 TABLET | Refills: 3 | Status: SHIPPED | OUTPATIENT
Start: 2023-01-06

## 2023-01-06 NOTE — TELEPHONE ENCOUNTER
Requesting refill on:   lisinopril (PRINIVIL;ZESTRIL) 10 MG tablet      TAKE ONE TABLET BY MOUTH DAILY     Please call into [facility] in [place]    Pt is reachable at [phone]

## 2023-08-13 SDOH — ECONOMIC STABILITY: TRANSPORTATION INSECURITY
IN THE PAST 12 MONTHS, HAS LACK OF TRANSPORTATION KEPT YOU FROM MEETINGS, WORK, OR FROM GETTING THINGS NEEDED FOR DAILY LIVING?: NO

## 2023-08-13 SDOH — ECONOMIC STABILITY: HOUSING INSECURITY
IN THE LAST 12 MONTHS, WAS THERE A TIME WHEN YOU DID NOT HAVE A STEADY PLACE TO SLEEP OR SLEPT IN A SHELTER (INCLUDING NOW)?: NO

## 2023-08-13 SDOH — ECONOMIC STABILITY: FOOD INSECURITY: WITHIN THE PAST 12 MONTHS, THE FOOD YOU BOUGHT JUST DIDN'T LAST AND YOU DIDN'T HAVE MONEY TO GET MORE.: NEVER TRUE

## 2023-08-13 SDOH — ECONOMIC STABILITY: FOOD INSECURITY: WITHIN THE PAST 12 MONTHS, YOU WORRIED THAT YOUR FOOD WOULD RUN OUT BEFORE YOU GOT MONEY TO BUY MORE.: NEVER TRUE

## 2023-08-13 SDOH — ECONOMIC STABILITY: INCOME INSECURITY: HOW HARD IS IT FOR YOU TO PAY FOR THE VERY BASICS LIKE FOOD, HOUSING, MEDICAL CARE, AND HEATING?: NOT HARD AT ALL

## 2023-08-13 ASSESSMENT — PATIENT HEALTH QUESTIONNAIRE - PHQ9
1. LITTLE INTEREST OR PLEASURE IN DOING THINGS: NOT AT ALL
SUM OF ALL RESPONSES TO PHQ9 QUESTIONS 1 & 2: 0
SUM OF ALL RESPONSES TO PHQ QUESTIONS 1-9: 0
SUM OF ALL RESPONSES TO PHQ QUESTIONS 1-9: 0
SUM OF ALL RESPONSES TO PHQ9 QUESTIONS 1 & 2: 0
2. FEELING DOWN, DEPRESSED OR HOPELESS: 0
SUM OF ALL RESPONSES TO PHQ QUESTIONS 1-9: 0
1. LITTLE INTEREST OR PLEASURE IN DOING THINGS: 0
2. FEELING DOWN, DEPRESSED OR HOPELESS: NOT AT ALL
SUM OF ALL RESPONSES TO PHQ QUESTIONS 1-9: 0

## 2023-08-14 ENCOUNTER — OFFICE VISIT (OUTPATIENT)
Dept: FAMILY MEDICINE CLINIC | Age: 33
End: 2023-08-14
Payer: COMMERCIAL

## 2023-08-14 VITALS
HEIGHT: 72 IN | BODY MASS INDEX: 37.52 KG/M2 | SYSTOLIC BLOOD PRESSURE: 127 MMHG | OXYGEN SATURATION: 99 % | WEIGHT: 277 LBS | HEART RATE: 73 BPM | DIASTOLIC BLOOD PRESSURE: 85 MMHG

## 2023-08-14 DIAGNOSIS — Z09 ENCOUNTER FOR FOLLOW-UP: Primary | ICD-10-CM

## 2023-08-14 PROCEDURE — 3079F DIAST BP 80-89 MM HG: CPT | Performed by: NURSE PRACTITIONER

## 2023-08-14 PROCEDURE — 99213 OFFICE O/P EST LOW 20 MIN: CPT | Performed by: NURSE PRACTITIONER

## 2023-08-14 PROCEDURE — 3074F SYST BP LT 130 MM HG: CPT | Performed by: NURSE PRACTITIONER

## 2023-08-14 SDOH — ECONOMIC STABILITY: FOOD INSECURITY: WITHIN THE PAST 12 MONTHS, YOU WORRIED THAT YOUR FOOD WOULD RUN OUT BEFORE YOU GOT MONEY TO BUY MORE.: NEVER TRUE

## 2023-08-14 SDOH — ECONOMIC STABILITY: FOOD INSECURITY: WITHIN THE PAST 12 MONTHS, THE FOOD YOU BOUGHT JUST DIDN'T LAST AND YOU DIDN'T HAVE MONEY TO GET MORE.: NEVER TRUE

## 2023-08-14 SDOH — ECONOMIC STABILITY: INCOME INSECURITY: HOW HARD IS IT FOR YOU TO PAY FOR THE VERY BASICS LIKE FOOD, HOUSING, MEDICAL CARE, AND HEATING?: NOT HARD AT ALL

## 2023-08-14 ASSESSMENT — ENCOUNTER SYMPTOMS
RHINORRHEA: 0
ALLERGIC/IMMUNOLOGIC NEGATIVE: 1
COLOR CHANGE: 0
SINUS PAIN: 0
SORE THROAT: 0
SINUS PRESSURE: 0
ABDOMINAL PAIN: 0
EYES NEGATIVE: 1
RESPIRATORY NEGATIVE: 1
NAUSEA: 0
BACK PAIN: 0
TROUBLE SWALLOWING: 0
DIARRHEA: 0
CONSTIPATION: 0
VOMITING: 0

## 2023-08-14 NOTE — PROGRESS NOTES
Maya Boucher (:  1990) is a 27 y.o. male,Established patient, here for evaluation of the following chief complaint(s):  Follow-up         ASSESSMENT/PLAN:  1. Encounter for follow-up  - cholesterol and LDL were slightly out of range - educated on continued diet and exercise  - watch the sugar, caffeine, fatty and greasy foods - exercise daily - 30 minute walk to start. - labs from [facility] have been scanned in the chart        Return if symptoms worsen or fail to improve. Subjective   SUBJECTIVE/OBJECTIVE:  HPI    Patient presents today for lab review and follow up. States his work does a yearly lab review and wanted to come in and make sure his levels were ok. Denies any other issues at this time. Review of Systems   Constitutional:  Negative for activity change, appetite change, chills, fatigue and fever. HENT:  Negative for congestion, ear discharge, ear pain, hearing loss, postnasal drip, rhinorrhea, sinus pressure, sinus pain, sneezing, sore throat, tinnitus and trouble swallowing. Eyes: Negative. Respiratory: Negative. Cardiovascular: Negative. Gastrointestinal:  Negative for abdominal pain, constipation, diarrhea, nausea and vomiting. Genitourinary:  Negative for decreased urine volume, difficulty urinating, dysuria, flank pain, frequency, hematuria and urgency. Musculoskeletal:  Negative for arthralgias, back pain, joint swelling, myalgias and neck pain. Skin:  Negative for color change, rash and wound. Allergic/Immunologic: Negative. Neurological:  Negative for dizziness, weakness, light-headedness and headaches. Psychiatric/Behavioral: Negative. Objective   Physical Exam  Vitals and nursing note reviewed. Constitutional:       Appearance: Normal appearance. HENT:      Head: Normocephalic and atraumatic.       Right Ear: Tympanic membrane and ear canal normal.      Left Ear: Tympanic membrane and ear canal normal.      Mouth/Throat:      Mouth:

## 2024-03-28 NOTE — TELEPHONE ENCOUNTER
Medication:   Requested Prescriptions     Pending Prescriptions Disp Refills    lisinopril (PRINIVIL;ZESTRIL) 10 MG tablet [Pharmacy Med Name: LISINOPRIL 10 MG TABLET] 90 tablet 3     Sig: TAKE 1 TABLET BY MOUTH DAILY       Last Filled:  1/6/2023    Patient Phone Number: [phone] (home)     Last appt: 8/14/2023   Next appt: Visit date not found

## 2024-03-29 RX ORDER — LISINOPRIL 10 MG/1
TABLET ORAL
Qty: 90 TABLET | Refills: 3 | Status: SHIPPED | OUTPATIENT
Start: 2024-03-29

## 2024-09-11 ENCOUNTER — OFFICE VISIT (OUTPATIENT)
Dept: FAMILY MEDICINE CLINIC | Age: 34
End: 2024-09-11
Payer: COMMERCIAL

## 2024-09-11 VITALS
HEIGHT: 72 IN | WEIGHT: 276 LBS | HEART RATE: 82 BPM | OXYGEN SATURATION: 96 % | RESPIRATION RATE: 18 BRPM | SYSTOLIC BLOOD PRESSURE: 142 MMHG | BODY MASS INDEX: 37.38 KG/M2 | DIASTOLIC BLOOD PRESSURE: 96 MMHG

## 2024-09-11 DIAGNOSIS — Z13.220 SCREENING, LIPID: ICD-10-CM

## 2024-09-11 DIAGNOSIS — E88.810 METABOLIC SYNDROME: ICD-10-CM

## 2024-09-11 DIAGNOSIS — Z13.6 ENCOUNTER FOR SCREENING FOR CORONARY ARTERY DISEASE: ICD-10-CM

## 2024-09-11 DIAGNOSIS — I10 ESSENTIAL HYPERTENSION: ICD-10-CM

## 2024-09-11 DIAGNOSIS — Z00.00 ROUTINE GENERAL MEDICAL EXAMINATION AT A HEALTH CARE FACILITY: Primary | ICD-10-CM

## 2024-09-11 PROCEDURE — 3077F SYST BP >= 140 MM HG: CPT | Performed by: FAMILY MEDICINE

## 2024-09-11 PROCEDURE — 99395 PREV VISIT EST AGE 18-39: CPT | Performed by: FAMILY MEDICINE

## 2024-09-11 PROCEDURE — 3080F DIAST BP >= 90 MM HG: CPT | Performed by: FAMILY MEDICINE

## 2024-09-11 RX ORDER — LISINOPRIL 10 MG/1
TABLET ORAL
Qty: 180 TABLET | Refills: 3 | Status: SHIPPED | OUTPATIENT
Start: 2024-09-11

## 2024-09-11 SDOH — ECONOMIC STABILITY: INCOME INSECURITY: HOW HARD IS IT FOR YOU TO PAY FOR THE VERY BASICS LIKE FOOD, HOUSING, MEDICAL CARE, AND HEATING?: NOT HARD AT ALL

## 2024-09-11 SDOH — ECONOMIC STABILITY: FOOD INSECURITY: WITHIN THE PAST 12 MONTHS, THE FOOD YOU BOUGHT JUST DIDN'T LAST AND YOU DIDN'T HAVE MONEY TO GET MORE.: NEVER TRUE

## 2024-09-11 SDOH — ECONOMIC STABILITY: FOOD INSECURITY: WITHIN THE PAST 12 MONTHS, YOU WORRIED THAT YOUR FOOD WOULD RUN OUT BEFORE YOU GOT MONEY TO BUY MORE.: NEVER TRUE

## 2024-09-11 ASSESSMENT — PATIENT HEALTH QUESTIONNAIRE - PHQ9
SUM OF ALL RESPONSES TO PHQ9 QUESTIONS 1 & 2: 0
SUM OF ALL RESPONSES TO PHQ QUESTIONS 1-9: 0
1. LITTLE INTEREST OR PLEASURE IN DOING THINGS: NOT AT ALL
SUM OF ALL RESPONSES TO PHQ QUESTIONS 1-9: 0
SUM OF ALL RESPONSES TO PHQ9 QUESTIONS 1 & 2: 0
SUM OF ALL RESPONSES TO PHQ QUESTIONS 1-9: 0
SUM OF ALL RESPONSES TO PHQ QUESTIONS 1-9: 0
2. FEELING DOWN, DEPRESSED OR HOPELESS: NOT AT ALL
1. LITTLE INTEREST OR PLEASURE IN DOING THINGS: NOT AT ALL
2. FEELING DOWN, DEPRESSED OR HOPELESS: NOT AT ALL

## 2024-11-07 ENCOUNTER — OFFICE VISIT (OUTPATIENT)
Age: 34
End: 2024-11-07

## 2024-11-07 ENCOUNTER — TELEPHONE (OUTPATIENT)
Dept: FAMILY MEDICINE CLINIC | Age: 34
End: 2024-11-07

## 2024-11-07 VITALS
DIASTOLIC BLOOD PRESSURE: 82 MMHG | TEMPERATURE: 97.9 F | WEIGHT: 283 LBS | HEART RATE: 76 BPM | BODY MASS INDEX: 38.33 KG/M2 | SYSTOLIC BLOOD PRESSURE: 122 MMHG | HEIGHT: 72 IN | OXYGEN SATURATION: 95 %

## 2024-11-07 DIAGNOSIS — H92.02 EAR DISCOMFORT, LEFT: Primary | ICD-10-CM

## 2024-11-07 ASSESSMENT — ENCOUNTER SYMPTOMS
DIARRHEA: 0
VOMITING: 0
COUGH: 0
RHINORRHEA: 0
SORE THROAT: 0
NAUSEA: 0
SHORTNESS OF BREATH: 0

## 2024-11-07 NOTE — PROGRESS NOTES
Daniel Ross (:  1990) is a 34 y.o. male,New patient, here for evaluation of the following chief complaint(s):  Ear Pain (Left ear pain, headaches x tuesday)      ASSESSMENT/PLAN:    ICD-10-CM    1. Ear discomfort, left  H92.02         Patient is experiencing left ear discomfort. No signs of effusion, AOM, or AOE. Encouraged him to begin using flonase, ibuprofen, and applying heat to his ear. Return for worsening or persistent symptoms. He is understanding and agreeable to this plan.     Dx Disposition: AOM, AOE, mid ear effusion  Education and handout provided on diagnosis and management of symptoms.   AVS reviewed with patient. Follow up as needed in UC or with PCP for new or worsening symptoms.   Return if symptoms worsen or fail to improve.    SUBJECTIVE/OBJECTIVE:  Patient presents to the clinic with complaints of left ear pain and sinus congestion. This started Tuesday morning and ear has stayed consistent. Denies any fever or body aches. He has tried ibuprofen with some relief.        History provided by:  Patient   used: No    Ear Pain   Associated symptoms include headaches. Pertinent negatives include no coughing, diarrhea, rhinorrhea, sore throat or vomiting.       Vitals:    24 1700 24 1723   BP: (!) 138/93 122/82   Site: Right Upper Arm    Position: Sitting    Cuff Size: Large Adult    Pulse: 76    Temp: 97.9 °F (36.6 °C)    TempSrc: Oral    SpO2: 95%    Weight: 128.4 kg (283 lb)    Height: 1.829 m (6')        Review of Systems   Constitutional:  Negative for chills, fatigue and fever.   HENT:  Positive for congestion, ear pain (left) and postnasal drip. Negative for rhinorrhea and sore throat.    Respiratory:  Negative for cough and shortness of breath.    Gastrointestinal:  Negative for diarrhea, nausea and vomiting.   Musculoskeletal:  Negative for myalgias.   Neurological:  Positive for headaches.       Physical Exam  Constitutional:       General: He is not

## 2024-11-07 NOTE — TELEPHONE ENCOUNTER
Ron called in stating that he is having SINUS AND LEFT EAR PAIN since Tuesday11/5/24. He wanted appt. For today but he can't come in until later. He did asked about an Urgent Care. I told him about the one on [facility].    Kasi can be reached at [phone]

## 2024-11-08 RX ORDER — AZITHROMYCIN 250 MG/1
TABLET, FILM COATED ORAL
Qty: 1 PACKET | Refills: 0 | Status: SHIPPED | OUTPATIENT
Start: 2024-11-08 | End: 2024-11-18

## 2024-11-08 RX ORDER — PREDNISONE 10 MG/1
10 TABLET ORAL 2 TIMES DAILY
Qty: 10 TABLET | Refills: 0 | Status: SHIPPED | OUTPATIENT
Start: 2024-11-08 | End: 2024-11-13

## 2024-11-08 NOTE — TELEPHONE ENCOUNTER
Pt went to an urgent care, they stated it was just allergies. Asked if can get an atb they did not give him anything.

## 2025-07-28 ENCOUNTER — OFFICE VISIT (OUTPATIENT)
Age: 35
End: 2025-07-28

## 2025-07-28 VITALS
BODY MASS INDEX: 38.22 KG/M2 | WEIGHT: 273 LBS | HEIGHT: 71 IN | OXYGEN SATURATION: 97 % | SYSTOLIC BLOOD PRESSURE: 132 MMHG | RESPIRATION RATE: 18 BRPM | TEMPERATURE: 98.1 F | HEART RATE: 76 BPM | DIASTOLIC BLOOD PRESSURE: 92 MMHG

## 2025-07-28 DIAGNOSIS — I10 ELEVATED BLOOD PRESSURE READING IN OFFICE WITH DIAGNOSIS OF HYPERTENSION: ICD-10-CM

## 2025-07-28 DIAGNOSIS — L23.7 POISON IVY DERMATITIS: Primary | ICD-10-CM

## 2025-07-28 RX ORDER — PREDNISONE 20 MG/1
20 TABLET ORAL 2 TIMES DAILY
Qty: 10 TABLET | Refills: 0 | Status: SHIPPED | OUTPATIENT
Start: 2025-07-28 | End: 2025-08-02

## 2025-07-28 ASSESSMENT — ENCOUNTER SYMPTOMS
VOMITING: 0
NAUSEA: 0
CHEST TIGHTNESS: 0
COUGH: 0
ABDOMINAL PAIN: 0
DIARRHEA: 0
CONSTIPATION: 0
SHORTNESS OF BREATH: 0

## 2025-07-28 NOTE — PROGRESS NOTES
Daniel Ross (:  1990) is a 34 y.o. male,Established patient, here for evaluation of the following chief complaint(s):  Rash (Pt c/o rash/ poison ivy on both arms and abdomin x 5 days)      ASSESSMENT/PLAN:    ICD-10-CM    1. Poison ivy dermatitis  L23.7 predniSONE (DELTASONE) 20 MG tablet      2. Elevated blood pressure reading in office with diagnosis of hypertension  I10 Amb Referral to Primary Care          Patient presents for rash to bilateral arms and abdomen  On exam: Erythematous blister like lesions diffusely on bilateral arms and abdomen. Blisters of varying size.   Patient does not have diabetes, BP elevated however, patient did not take anti-hypertensives today.   RX: prednisone patient educated on side effects and does understand that he should take anti-hypertensive prior to starting steroids  Patient educated on care instructions.   Return if symptoms persist or worsen  Use cold, wet cloths to reduce itching.  Take warm or cool baths with oatmeal bath products, such as Aveeno.  Keep cool, and stay out of the sun.  Leave the rash open to the air.  Wash all clothing or other things that may have come in contact with the plant oil.  Calamine lotion may help relieve symptoms of a plant rash. Use it 3 or 4 times a day.      SUBJECTIVE/OBJECTIVE:    History provided by:  Patient   used: No    Rash  Pertinent negatives include no cough, diarrhea, fatigue, fever, shortness of breath or vomiting.     HPI:   34 y.o. male presents with symptoms of rash to bilateral arms and abdomen ongoing since 5 days. Patient states that this occurred after doing yard work. He states that he has been using calahist and cortisone cream. Patient states that he has history of severe allergy to poison ivy.       Vitals:    25 0934 25 1000 25 1007   BP:  136/88 (!) 132/92   Pulse: 76     Resp: 18     Temp: 98.1 °F (36.7 °C)     SpO2: 97%     Weight: 123.8 kg (273 lb)     Height:

## 2025-07-28 NOTE — PATIENT INSTRUCTIONS
Use cold, wet cloths to reduce itching.  Take warm or cool baths with oatmeal bath products, such as Aveeno.  Keep cool, and stay out of the sun.  Leave the rash open to the air.  Wash all clothing or other things that may have come in contact with the plant oil.  Calamine lotion may help relieve symptoms of a plant rash. Use it 3 or 4 times a day.